# Patient Record
Sex: MALE | Race: WHITE | Employment: OTHER
[De-identification: names, ages, dates, MRNs, and addresses within clinical notes are randomized per-mention and may not be internally consistent; named-entity substitution may affect disease eponyms.]

---

## 2024-11-03 ENCOUNTER — APPOINTMENT (OUTPATIENT)
Facility: HOSPITAL | Age: 79
DRG: 548 | End: 2024-11-03
Payer: MEDICARE

## 2024-11-03 ENCOUNTER — HOSPITAL ENCOUNTER (INPATIENT)
Facility: HOSPITAL | Age: 79
LOS: 3 days | Discharge: HOME | DRG: 548 | End: 2024-11-07
Attending: EMERGENCY MEDICINE | Admitting: HOSPITALIST
Payer: MEDICARE

## 2024-11-03 DIAGNOSIS — R78.81 BACTEREMIA: ICD-10-CM

## 2024-11-03 DIAGNOSIS — D72.829 LEUKOCYTOSIS, UNSPECIFIED TYPE: ICD-10-CM

## 2024-11-03 DIAGNOSIS — M46.58 SEPTIC ARTHRITIS OF SACROILIAC JOINT (HCC): ICD-10-CM

## 2024-11-03 DIAGNOSIS — R10.33 PERIUMBILICAL ABDOMINAL PAIN: Primary | ICD-10-CM

## 2024-11-03 DIAGNOSIS — E80.6 HYPERBILIRUBINEMIA: ICD-10-CM

## 2024-11-03 PROBLEM — R10.9 ABDOMINAL PAIN: Status: ACTIVE | Noted: 2024-11-03

## 2024-11-03 LAB
ALBUMIN SERPL-MCNC: 3 G/DL (ref 3.5–5)
ALBUMIN SERPL-MCNC: 3.1 G/DL (ref 3.5–5)
ALBUMIN SERPL-MCNC: 3.7 G/DL (ref 3.5–5)
ALBUMIN/GLOB SERPL: 0.9 (ref 1.1–2.2)
ALBUMIN/GLOB SERPL: 1 (ref 1.1–2.2)
ALBUMIN/GLOB SERPL: 1 (ref 1.1–2.2)
ALP SERPL-CCNC: 60 U/L (ref 45–117)
ALP SERPL-CCNC: 68 U/L (ref 45–117)
ALP SERPL-CCNC: 74 U/L (ref 45–117)
ALT SERPL-CCNC: 19 U/L (ref 12–78)
ALT SERPL-CCNC: 42 U/L (ref 12–78)
ALT SERPL-CCNC: 49 U/L (ref 12–78)
AMMONIA PLAS-SCNC: 23 UMOL/L
ANION GAP SERPL CALC-SCNC: 4 MMOL/L (ref 2–12)
ANION GAP SERPL CALC-SCNC: 5 MMOL/L (ref 2–12)
ANION GAP SERPL CALC-SCNC: 6 MMOL/L (ref 2–12)
APPEARANCE UR: CLEAR
AST SERPL-CCNC: 16 U/L (ref 15–37)
AST SERPL-CCNC: 33 U/L (ref 15–37)
AST SERPL-CCNC: 39 U/L (ref 15–37)
BACTERIA URNS QL MICRO: NEGATIVE /HPF
BASOPHILS # BLD: 0.1 K/UL (ref 0–0.1)
BASOPHILS # BLD: 0.2 K/UL (ref 0–0.1)
BASOPHILS NFR BLD: 1 % (ref 0–1)
BASOPHILS NFR BLD: 1 % (ref 0–1)
BILIRUB SERPL-MCNC: 2 MG/DL (ref 0.2–1)
BILIRUB SERPL-MCNC: 2.1 MG/DL (ref 0.2–1)
BILIRUB SERPL-MCNC: 2.2 MG/DL (ref 0.2–1)
BILIRUB UR QL: NEGATIVE
BUN SERPL-MCNC: 25 MG/DL (ref 6–20)
BUN SERPL-MCNC: 27 MG/DL (ref 6–20)
BUN SERPL-MCNC: 28 MG/DL (ref 6–20)
BUN/CREAT SERPL: 22 (ref 12–20)
BUN/CREAT SERPL: 26 (ref 12–20)
BUN/CREAT SERPL: 26 (ref 12–20)
CALCIUM SERPL-MCNC: 7.9 MG/DL (ref 8.5–10.1)
CALCIUM SERPL-MCNC: 8.3 MG/DL (ref 8.5–10.1)
CALCIUM SERPL-MCNC: 9.4 MG/DL (ref 8.5–10.1)
CHLORIDE SERPL-SCNC: 106 MMOL/L (ref 97–108)
CHLORIDE SERPL-SCNC: 107 MMOL/L (ref 97–108)
CHLORIDE SERPL-SCNC: 107 MMOL/L (ref 97–108)
CO2 SERPL-SCNC: 25 MMOL/L (ref 21–32)
CO2 SERPL-SCNC: 26 MMOL/L (ref 21–32)
CO2 SERPL-SCNC: 28 MMOL/L (ref 21–32)
COLOR UR: NORMAL
COMMENT:: NORMAL
COMMENT:: NORMAL
CREAT SERPL-MCNC: 0.97 MG/DL (ref 0.7–1.3)
CREAT SERPL-MCNC: 1.05 MG/DL (ref 0.7–1.3)
CREAT SERPL-MCNC: 1.28 MG/DL (ref 0.7–1.3)
DIFFERENTIAL METHOD BLD: ABNORMAL
DIFFERENTIAL METHOD BLD: ABNORMAL
EKG ATRIAL RATE: 77 BPM
EKG DIAGNOSIS: NORMAL
EKG P AXIS: 47 DEGREES
EKG P-R INTERVAL: 232 MS
EKG Q-T INTERVAL: 384 MS
EKG QRS DURATION: 98 MS
EKG QTC CALCULATION (BAZETT): 434 MS
EKG R AXIS: 7 DEGREES
EKG T AXIS: 24 DEGREES
EKG VENTRICULAR RATE: 77 BPM
EOSINOPHIL # BLD: 0 K/UL (ref 0–0.4)
EOSINOPHIL # BLD: 0 K/UL (ref 0–0.4)
EOSINOPHIL NFR BLD: 0 % (ref 0–7)
EOSINOPHIL NFR BLD: 0 % (ref 0–7)
EPITH CASTS URNS QL MICRO: NORMAL /LPF
ERYTHROCYTE [DISTWIDTH] IN BLOOD BY AUTOMATED COUNT: 12.4 % (ref 11.5–14.5)
ERYTHROCYTE [DISTWIDTH] IN BLOOD BY AUTOMATED COUNT: 12.7 % (ref 11.5–14.5)
FLUAV RNA SPEC QL NAA+PROBE: NOT DETECTED
FLUBV RNA SPEC QL NAA+PROBE: NOT DETECTED
GLOBULIN SER CALC-MCNC: 3 G/DL (ref 2–4)
GLOBULIN SER CALC-MCNC: 3.3 G/DL (ref 2–4)
GLOBULIN SER CALC-MCNC: 3.8 G/DL (ref 2–4)
GLUCOSE BLD STRIP.AUTO-MCNC: 103 MG/DL (ref 65–117)
GLUCOSE BLD STRIP.AUTO-MCNC: 140 MG/DL (ref 65–117)
GLUCOSE SERPL-MCNC: 104 MG/DL (ref 65–100)
GLUCOSE SERPL-MCNC: 108 MG/DL (ref 65–100)
GLUCOSE SERPL-MCNC: 154 MG/DL (ref 65–100)
GLUCOSE UR STRIP.AUTO-MCNC: NEGATIVE MG/DL
HCT VFR BLD AUTO: 36 % (ref 36.6–50.3)
HCT VFR BLD AUTO: 40.8 % (ref 36.6–50.3)
HGB BLD-MCNC: 12.3 G/DL (ref 12.1–17)
HGB BLD-MCNC: 13.9 G/DL (ref 12.1–17)
HGB UR QL STRIP: NEGATIVE
HYALINE CASTS URNS QL MICRO: NORMAL /LPF (ref 0–5)
IMM GRANULOCYTES # BLD AUTO: 0 K/UL
IMM GRANULOCYTES # BLD AUTO: 0.1 K/UL (ref 0–0.04)
IMM GRANULOCYTES NFR BLD AUTO: 0 %
IMM GRANULOCYTES NFR BLD AUTO: 1 % (ref 0–0.5)
KETONES UR QL STRIP.AUTO: NEGATIVE MG/DL
LACTATE BLD-SCNC: 0.63 MMOL/L (ref 0.4–2)
LACTATE BLD-SCNC: 0.76 MMOL/L (ref 0.4–2)
LACTATE SERPL-SCNC: 0.8 MMOL/L (ref 0.4–2)
LACTATE SERPL-SCNC: 1.8 MMOL/L (ref 0.4–2)
LEUKOCYTE ESTERASE UR QL STRIP.AUTO: NEGATIVE
LIPASE SERPL-CCNC: 36 U/L (ref 13–75)
LYMPHOCYTES # BLD: 0.3 K/UL (ref 0.8–3.5)
LYMPHOCYTES # BLD: 0.9 K/UL (ref 0.8–3.5)
LYMPHOCYTES NFR BLD: 2 % (ref 12–49)
LYMPHOCYTES NFR BLD: 4 % (ref 12–49)
MCH RBC QN AUTO: 33.4 PG (ref 26–34)
MCH RBC QN AUTO: 33.6 PG (ref 26–34)
MCHC RBC AUTO-ENTMCNC: 34.1 G/DL (ref 30–36.5)
MCHC RBC AUTO-ENTMCNC: 34.2 G/DL (ref 30–36.5)
MCV RBC AUTO: 98.1 FL (ref 80–99)
MCV RBC AUTO: 98.4 FL (ref 80–99)
MONOCYTES # BLD: 0.9 K/UL (ref 0–1)
MONOCYTES # BLD: 1.6 K/UL (ref 0–1)
MONOCYTES NFR BLD: 6 % (ref 5–13)
MONOCYTES NFR BLD: 7 % (ref 5–13)
NEUTS BAND NFR BLD MANUAL: 5 % (ref 0–6)
NEUTS SEG # BLD: 13.2 K/UL (ref 1.8–8)
NEUTS SEG # BLD: 20.6 K/UL (ref 1.8–8)
NEUTS SEG NFR BLD: 83 % (ref 32–75)
NEUTS SEG NFR BLD: 90 % (ref 32–75)
NITRITE UR QL STRIP.AUTO: NEGATIVE
NRBC # BLD: 0 K/UL (ref 0–0.01)
NRBC # BLD: 0 K/UL (ref 0–0.01)
NRBC BLD-RTO: 0 PER 100 WBC
NRBC BLD-RTO: 0 PER 100 WBC
PH UR STRIP: 5.5 (ref 5–8)
PLATELET # BLD AUTO: 128 K/UL (ref 150–400)
PLATELET # BLD AUTO: 179 K/UL (ref 150–400)
PMV BLD AUTO: 10.4 FL (ref 8.9–12.9)
PMV BLD AUTO: 9.9 FL (ref 8.9–12.9)
POTASSIUM SERPL-SCNC: 3.9 MMOL/L (ref 3.5–5.1)
POTASSIUM SERPL-SCNC: 3.9 MMOL/L (ref 3.5–5.1)
POTASSIUM SERPL-SCNC: 4.1 MMOL/L (ref 3.5–5.1)
PROCALCITONIN SERPL-MCNC: 4.24 NG/ML
PROT SERPL-MCNC: 6.1 G/DL (ref 6.4–8.2)
PROT SERPL-MCNC: 6.3 G/DL (ref 6.4–8.2)
PROT SERPL-MCNC: 7.5 G/DL (ref 6.4–8.2)
PROT UR STRIP-MCNC: NEGATIVE MG/DL
RBC # BLD AUTO: 3.66 M/UL (ref 4.1–5.7)
RBC # BLD AUTO: 4.16 M/UL (ref 4.1–5.7)
RBC #/AREA URNS HPF: NORMAL /HPF (ref 0–5)
RBC MORPH BLD: ABNORMAL
RBC MORPH BLD: ABNORMAL
SARS-COV-2 RNA RESP QL NAA+PROBE: NOT DETECTED
SERVICE CMNT-IMP: ABNORMAL
SERVICE CMNT-IMP: NORMAL
SODIUM SERPL-SCNC: 137 MMOL/L (ref 136–145)
SODIUM SERPL-SCNC: 138 MMOL/L (ref 136–145)
SODIUM SERPL-SCNC: 139 MMOL/L (ref 136–145)
SOURCE: NORMAL
SP GR UR REFRACTOMETRY: 1.01
SPECIMEN HOLD: NORMAL
TROPONIN I SERPL HS-MCNC: 19 NG/L (ref 0–76)
UROBILINOGEN UR QL STRIP.AUTO: 1 EU/DL (ref 0.2–1)
WBC # BLD AUTO: 14.6 K/UL (ref 4.1–11.1)
WBC # BLD AUTO: 23.3 K/UL (ref 4.1–11.1)
WBC URNS QL MICRO: NORMAL /HPF (ref 0–4)

## 2024-11-03 PROCEDURE — 87040 BLOOD CULTURE FOR BACTERIA: CPT

## 2024-11-03 PROCEDURE — 73502 X-RAY EXAM HIP UNI 2-3 VIEWS: CPT

## 2024-11-03 PROCEDURE — 96365 THER/PROPH/DIAG IV INF INIT: CPT

## 2024-11-03 PROCEDURE — 82140 ASSAY OF AMMONIA: CPT

## 2024-11-03 PROCEDURE — 74174 CTA ABD&PLVS W/CONTRAST: CPT

## 2024-11-03 PROCEDURE — 85025 COMPLETE CBC W/AUTO DIFF WBC: CPT

## 2024-11-03 PROCEDURE — 84484 ASSAY OF TROPONIN QUANT: CPT

## 2024-11-03 PROCEDURE — 81001 URINALYSIS AUTO W/SCOPE: CPT

## 2024-11-03 PROCEDURE — 6360000004 HC RX CONTRAST MEDICATION: Performed by: RADIOLOGY

## 2024-11-03 PROCEDURE — G0378 HOSPITAL OBSERVATION PER HR: HCPCS

## 2024-11-03 PROCEDURE — 2580000003 HC RX 258: Performed by: NURSE PRACTITIONER

## 2024-11-03 PROCEDURE — 6360000002 HC RX W HCPCS: Performed by: NURSE PRACTITIONER

## 2024-11-03 PROCEDURE — 82962 GLUCOSE BLOOD TEST: CPT

## 2024-11-03 PROCEDURE — 96361 HYDRATE IV INFUSION ADD-ON: CPT

## 2024-11-03 PROCEDURE — 87636 SARSCOV2 & INF A&B AMP PRB: CPT

## 2024-11-03 PROCEDURE — 96372 THER/PROPH/DIAG INJ SC/IM: CPT

## 2024-11-03 PROCEDURE — 87186 SC STD MICRODIL/AGAR DIL: CPT

## 2024-11-03 PROCEDURE — 2580000003 HC RX 258: Performed by: STUDENT IN AN ORGANIZED HEALTH CARE EDUCATION/TRAINING PROGRAM

## 2024-11-03 PROCEDURE — 83690 ASSAY OF LIPASE: CPT

## 2024-11-03 PROCEDURE — 87154 CUL TYP ID BLD PTHGN 6+ TRGT: CPT

## 2024-11-03 PROCEDURE — 6360000002 HC RX W HCPCS: Performed by: EMERGENCY MEDICINE

## 2024-11-03 PROCEDURE — 6360000002 HC RX W HCPCS: Performed by: STUDENT IN AN ORGANIZED HEALTH CARE EDUCATION/TRAINING PROGRAM

## 2024-11-03 PROCEDURE — 80053 COMPREHEN METABOLIC PANEL: CPT

## 2024-11-03 PROCEDURE — 96374 THER/PROPH/DIAG INJ IV PUSH: CPT

## 2024-11-03 PROCEDURE — 93005 ELECTROCARDIOGRAM TRACING: CPT | Performed by: EMERGENCY MEDICINE

## 2024-11-03 PROCEDURE — 87077 CULTURE AEROBIC IDENTIFY: CPT

## 2024-11-03 PROCEDURE — 6370000000 HC RX 637 (ALT 250 FOR IP): Performed by: EMERGENCY MEDICINE

## 2024-11-03 PROCEDURE — 6370000000 HC RX 637 (ALT 250 FOR IP): Performed by: NURSE PRACTITIONER

## 2024-11-03 PROCEDURE — 36415 COLL VENOUS BLD VENIPUNCTURE: CPT

## 2024-11-03 PROCEDURE — 96375 TX/PRO/DX INJ NEW DRUG ADDON: CPT

## 2024-11-03 PROCEDURE — 93010 ELECTROCARDIOGRAM REPORT: CPT | Performed by: INTERNAL MEDICINE

## 2024-11-03 PROCEDURE — 96366 THER/PROPH/DIAG IV INF ADDON: CPT

## 2024-11-03 PROCEDURE — 83605 ASSAY OF LACTIC ACID: CPT

## 2024-11-03 PROCEDURE — 99285 EMERGENCY DEPT VISIT HI MDM: CPT

## 2024-11-03 PROCEDURE — 84145 PROCALCITONIN (PCT): CPT

## 2024-11-03 PROCEDURE — 71045 X-RAY EXAM CHEST 1 VIEW: CPT

## 2024-11-03 RX ORDER — SODIUM CHLORIDE 9 MG/ML
INJECTION, SOLUTION INTRAVENOUS PRN
Status: DISCONTINUED | OUTPATIENT
Start: 2024-11-03 | End: 2024-11-07 | Stop reason: HOSPADM

## 2024-11-03 RX ORDER — FAMOTIDINE 20 MG/1
20 TABLET, FILM COATED ORAL 2 TIMES DAILY
Qty: 30 TABLET | Refills: 0 | Status: SHIPPED | OUTPATIENT
Start: 2024-11-03

## 2024-11-03 RX ORDER — MAGNESIUM SULFATE IN WATER 40 MG/ML
2000 INJECTION, SOLUTION INTRAVENOUS PRN
Status: DISCONTINUED | OUTPATIENT
Start: 2024-11-03 | End: 2024-11-07 | Stop reason: HOSPADM

## 2024-11-03 RX ORDER — LOSARTAN POTASSIUM 50 MG/1
100 TABLET ORAL DAILY
Status: DISCONTINUED | OUTPATIENT
Start: 2024-11-03 | End: 2024-11-07 | Stop reason: HOSPADM

## 2024-11-03 RX ORDER — ACETAMINOPHEN 500 MG
1000 TABLET ORAL
Status: COMPLETED | OUTPATIENT
Start: 2024-11-03 | End: 2024-11-03

## 2024-11-03 RX ORDER — LIDOCAINE 4 G/G
1 PATCH TOPICAL
Status: COMPLETED | OUTPATIENT
Start: 2024-11-03 | End: 2024-11-03

## 2024-11-03 RX ORDER — POLYETHYLENE GLYCOL 3350 17 G/17G
17 POWDER, FOR SOLUTION ORAL DAILY PRN
Status: DISCONTINUED | OUTPATIENT
Start: 2024-11-03 | End: 2024-11-07 | Stop reason: HOSPADM

## 2024-11-03 RX ORDER — 0.9 % SODIUM CHLORIDE 0.9 %
500 INTRAVENOUS SOLUTION INTRAVENOUS ONCE
Status: COMPLETED | OUTPATIENT
Start: 2024-11-03 | End: 2024-11-03

## 2024-11-03 RX ORDER — POTASSIUM CHLORIDE 750 MG/1
40 TABLET, EXTENDED RELEASE ORAL PRN
Status: DISCONTINUED | OUTPATIENT
Start: 2024-11-03 | End: 2024-11-07 | Stop reason: HOSPADM

## 2024-11-03 RX ORDER — SODIUM CHLORIDE 0.9 % (FLUSH) 0.9 %
5-40 SYRINGE (ML) INJECTION PRN
Status: DISCONTINUED | OUTPATIENT
Start: 2024-11-03 | End: 2024-11-07 | Stop reason: HOSPADM

## 2024-11-03 RX ORDER — ONDANSETRON 2 MG/ML
4 INJECTION INTRAMUSCULAR; INTRAVENOUS EVERY 6 HOURS PRN
Status: DISCONTINUED | OUTPATIENT
Start: 2024-11-03 | End: 2024-11-07 | Stop reason: HOSPADM

## 2024-11-03 RX ORDER — ONDANSETRON 4 MG/1
4 TABLET, ORALLY DISINTEGRATING ORAL EVERY 8 HOURS PRN
Status: DISCONTINUED | OUTPATIENT
Start: 2024-11-03 | End: 2024-11-03 | Stop reason: SDUPTHER

## 2024-11-03 RX ORDER — ACETAMINOPHEN 500 MG
1000 TABLET ORAL 3 TIMES DAILY PRN
Qty: 30 TABLET | Refills: 0 | Status: SHIPPED | OUTPATIENT
Start: 2024-11-03

## 2024-11-03 RX ORDER — POTASSIUM CHLORIDE 7.45 MG/ML
10 INJECTION INTRAVENOUS PRN
Status: DISCONTINUED | OUTPATIENT
Start: 2024-11-03 | End: 2024-11-07 | Stop reason: HOSPADM

## 2024-11-03 RX ORDER — VERAPAMIL HYDROCHLORIDE 180 MG/1
180 TABLET, EXTENDED RELEASE ORAL NIGHTLY
Status: DISCONTINUED | OUTPATIENT
Start: 2024-11-03 | End: 2024-11-07 | Stop reason: HOSPADM

## 2024-11-03 RX ORDER — IOPAMIDOL 755 MG/ML
100 INJECTION, SOLUTION INTRAVASCULAR
Status: COMPLETED | OUTPATIENT
Start: 2024-11-03 | End: 2024-11-03

## 2024-11-03 RX ORDER — ONDANSETRON 4 MG/1
4 TABLET, ORALLY DISINTEGRATING ORAL EVERY 8 HOURS PRN
Status: DISCONTINUED | OUTPATIENT
Start: 2024-11-03 | End: 2024-11-07 | Stop reason: HOSPADM

## 2024-11-03 RX ORDER — ACETAMINOPHEN 325 MG/1
650 TABLET ORAL EVERY 6 HOURS PRN
Status: DISCONTINUED | OUTPATIENT
Start: 2024-11-03 | End: 2024-11-07 | Stop reason: HOSPADM

## 2024-11-03 RX ORDER — ENOXAPARIN SODIUM 100 MG/ML
40 INJECTION SUBCUTANEOUS DAILY
Status: DISCONTINUED | OUTPATIENT
Start: 2024-11-03 | End: 2024-11-07 | Stop reason: HOSPADM

## 2024-11-03 RX ORDER — SODIUM CHLORIDE 9 MG/ML
INJECTION, SOLUTION INTRAVENOUS CONTINUOUS
Status: DISPENSED | OUTPATIENT
Start: 2024-11-03 | End: 2024-11-03

## 2024-11-03 RX ORDER — TAMSULOSIN HYDROCHLORIDE 0.4 MG/1
0.4 CAPSULE ORAL DAILY
Status: DISCONTINUED | OUTPATIENT
Start: 2024-11-03 | End: 2024-11-07 | Stop reason: HOSPADM

## 2024-11-03 RX ORDER — SODIUM CHLORIDE 0.9 % (FLUSH) 0.9 %
5-40 SYRINGE (ML) INJECTION EVERY 12 HOURS SCHEDULED
Status: DISCONTINUED | OUTPATIENT
Start: 2024-11-03 | End: 2024-11-07 | Stop reason: HOSPADM

## 2024-11-03 RX ORDER — ACETAMINOPHEN 650 MG/1
650 SUPPOSITORY RECTAL EVERY 6 HOURS PRN
Status: DISCONTINUED | OUTPATIENT
Start: 2024-11-03 | End: 2024-11-07 | Stop reason: HOSPADM

## 2024-11-03 RX ORDER — FAMOTIDINE 20 MG/1
20 TABLET, FILM COATED ORAL 2 TIMES DAILY
Status: DISCONTINUED | OUTPATIENT
Start: 2024-11-03 | End: 2024-11-07 | Stop reason: HOSPADM

## 2024-11-03 RX ORDER — KETOROLAC TROMETHAMINE 30 MG/ML
15 INJECTION, SOLUTION INTRAMUSCULAR; INTRAVENOUS
Status: COMPLETED | OUTPATIENT
Start: 2024-11-03 | End: 2024-11-03

## 2024-11-03 RX ADMIN — KETOROLAC TROMETHAMINE 15 MG: 30 INJECTION, SOLUTION INTRAMUSCULAR at 07:51

## 2024-11-03 RX ADMIN — ENOXAPARIN SODIUM 40 MG: 100 INJECTION SUBCUTANEOUS at 10:17

## 2024-11-03 RX ADMIN — WATER 2000 MG: 1 INJECTION INTRAMUSCULAR; INTRAVENOUS; SUBCUTANEOUS at 18:34

## 2024-11-03 RX ADMIN — SODIUM CHLORIDE, PRESERVATIVE FREE 10 ML: 5 INJECTION INTRAVENOUS at 20:14

## 2024-11-03 RX ADMIN — ACETAMINOPHEN 650 MG: 325 TABLET ORAL at 22:22

## 2024-11-03 RX ADMIN — ACETAMINOPHEN 650 MG: 325 TABLET ORAL at 17:50

## 2024-11-03 RX ADMIN — FAMOTIDINE 20 MG: 20 TABLET, FILM COATED ORAL at 07:54

## 2024-11-03 RX ADMIN — ONDANSETRON 4 MG: 4 TABLET, ORALLY DISINTEGRATING ORAL at 02:46

## 2024-11-03 RX ADMIN — IOPAMIDOL 100 ML: 755 INJECTION, SOLUTION INTRAVENOUS at 04:20

## 2024-11-03 RX ADMIN — VANCOMYCIN HYDROCHLORIDE 2250 MG: 10 INJECTION, POWDER, LYOPHILIZED, FOR SOLUTION INTRAVENOUS at 18:44

## 2024-11-03 RX ADMIN — LIDOCAINE HYDROCHLORIDE 40 ML: 20 SOLUTION ORAL at 02:46

## 2024-11-03 RX ADMIN — ACETAMINOPHEN 1000 MG: 500 TABLET ORAL at 07:53

## 2024-11-03 RX ADMIN — SODIUM CHLORIDE 500 ML: 9 INJECTION, SOLUTION INTRAVENOUS at 10:20

## 2024-11-03 RX ADMIN — FAMOTIDINE 20 MG: 20 TABLET, FILM COATED ORAL at 20:13

## 2024-11-03 RX ADMIN — TAMSULOSIN HYDROCHLORIDE 0.4 MG: 0.4 CAPSULE ORAL at 18:34

## 2024-11-03 RX ADMIN — FAMOTIDINE 20 MG: 20 TABLET, FILM COATED ORAL at 02:46

## 2024-11-03 ASSESSMENT — PAIN SCALES - GENERAL
PAINLEVEL_OUTOF10: 0
PAINLEVEL_OUTOF10: 6
PAINLEVEL_OUTOF10: 8
PAINLEVEL_OUTOF10: 7

## 2024-11-03 ASSESSMENT — PAIN DESCRIPTION - DESCRIPTORS
DESCRIPTORS: ACHING
DESCRIPTORS: DULL

## 2024-11-03 ASSESSMENT — PAIN - FUNCTIONAL ASSESSMENT
PAIN_FUNCTIONAL_ASSESSMENT: ACTIVITIES ARE NOT PREVENTED
PAIN_FUNCTIONAL_ASSESSMENT: 0-10
PAIN_FUNCTIONAL_ASSESSMENT: ACTIVITIES ARE NOT PREVENTED

## 2024-11-03 ASSESSMENT — PAIN DESCRIPTION - LOCATION
LOCATION: ABDOMEN;HIP
LOCATION: GENERALIZED
LOCATION: BUTTOCKS

## 2024-11-03 ASSESSMENT — PAIN DESCRIPTION - ONSET: ONSET: ON-GOING

## 2024-11-03 ASSESSMENT — PAIN DESCRIPTION - PAIN TYPE: TYPE: ACUTE PAIN

## 2024-11-03 ASSESSMENT — PAIN DESCRIPTION - FREQUENCY: FREQUENCY: CONTINUOUS

## 2024-11-03 ASSESSMENT — PAIN DESCRIPTION - ORIENTATION
ORIENTATION: MID;RIGHT;LEFT
ORIENTATION: RIGHT

## 2024-11-03 NOTE — ED TRIAGE NOTES
Patient arrives ambulatory with c/o knee, hip, and abdominal pain for the past few days. Denies N/V/D and urinary symptoms. States the pain is moving all over.

## 2024-11-03 NOTE — ED NOTES
Patient provided warm blanket and pillow.  
Verbal shift change report given to SINCERE Alejandre (oncoming nurse) by Marti SMITH RN (offgoing nurse). Report included the following information Nurse Handoff Report, ED SBAR, Intake/Output, MAR, Recent Results, and Neuro Assessment.    
83 (*)     Lymphocytes % 4 (*)     Neutrophils Absolute 20.6 (*)     Monocytes Absolute 1.6 (*)     Basophils Absolute 0.2 (*)     All other components within normal limits   COMPREHENSIVE METABOLIC PANEL - Abnormal; Notable for the following components:    Glucose 154 (*)     BUN 28 (*)     BUN/Creatinine Ratio 22 (*)     Est, Glom Filt Rate 56 (*)     Total Bilirubin 2.0 (*)     Albumin/Globulin Ratio 1.0 (*)     All other components within normal limits   COMPREHENSIVE METABOLIC PANEL - Abnormal; Notable for the following components:    Glucose 108 (*)     BUN 27 (*)     BUN/Creatinine Ratio 26 (*)     Calcium 8.3 (*)     Total Bilirubin 2.2 (*)     AST 39 (*)     Total Protein 6.3 (*)     Albumin 3.0 (*)     Albumin/Globulin Ratio 0.9 (*)     All other components within normal limits       Vitals/MEWS: MEWS Score: 0  Level of Consciousness: Alert (0)   Vitals:    11/03/24 0304 11/03/24 0311 11/03/24 0400 11/03/24 0800   BP: 111/70  106/69 134/76   Pulse: 79 73 73 89   Resp: 10 14 12 19   Temp:       TempSrc:       SpO2: (!) 89% 94% 95% 93%   Weight:       Height:         DI:   Predictive Model Details          35 (Caution)  Factor Value    Calculated 11/3/2024 12:20 36% Age 79 years old    Deterioration Index Model 31% Supplemental oxygen Nasal cannula     11% Respiratory rate 19     11% WBC count abnormal (23.3 K/uL)     3% Systolic 134     3% Pulse oximetry 93 %     3% BUN abnormal (27 MG/DL)     2% Sodium 138 mmol/L     1% Pulse 89     0% Temperature 97.9 °F (36.6 °C)     0% Potassium 3.9 mmol/L     0% Hematocrit 40.8 %         FiO2 (%): 2L NC  O2 Flow Rate: O2 Device: Nasal cannula O2 Flow Rate (L/min): 2 L/min  Cardiac Rhythm:      Pain Scale: Pain Assessment  Pain Assessment: 0-10  Pain Level: 6  Patient's Stated Pain Goal: 0 - No pain  Pain Location: Buttocks  Pain Orientation: Right  Pain Descriptors: Aching  Functional Pain Assessment: Activities are not prevented  Pain Type: Acute pain  Pain Radiating

## 2024-11-03 NOTE — ACP (ADVANCE CARE PLANNING)
Advance Care Planning     Advance Care Planning (ACP) Physician/NP/PA Conversation    Date of Conversation: 11/3/2024  Conducted with: Patient with Decision Making Capacity  Other persons present: None    Healthcare Decision Maker: Wife, Alexandra You, [phone]      Care Preferences:    Hospitalization:  \"If your health worsens and it becomes clear that your chance of recovery is unlikely, what would be your preference regarding hospitalization?\"  The patient would prefer hospitalization.    Ventilation:  \"If you were unable to breath on your own and your chance of recovery was unlikely, what would be your preference about the use of a ventilator (breathing machine) if it was available to you?\"  The patient would desire the use of a ventilator.    Resuscitation:  \"In the event your heart stopped as a result of an underlying serious health condition, would you want attempts made to restart your heart, or would you prefer a natural death?\"  Yes, attempt to resuscitate.    ventilation preferences, hospitalization preferences, and resuscitation preferences    Conversation Outcomes / Follow-Up Plan:  ACP complete - no further action today  Reviewed DNR/DNI and patient elects Full Code (Attempt Resuscitation)    Length of Voluntary ACP Conversation in minutes:  16 minutes    SHAY Diaz - KINGS

## 2024-11-03 NOTE — H&P
History and Physical    Date of Service:  11/3/2024  Primary Care Provider: Oscar Toledo MD  Source of information: patient, electronic medical record    Chief Complaint: Abdominal Pain and Hip Pain      History of Presenting Illness:   Perfecto You is a 79 y.o. male with a past medical history significant for hypertension reports to the emergency department at our facility with complaints of severe abdominal pain.  His trouble started about 2 weeks ago after walking [place] the golf [place] developed right knee pain which 2 days prior to presentation developed into right hip pain.  Overnight he developed severe abdominal pain and presented to the emergency department at our facility.  Further workup in the emergency department revealed leukocytosis, 23, BUN was mildly elevated at 28 however creatinine normal at 1.28.  Urinalysis was unremarkable,Chest radiograph was also unremarkable as was his AP CT.  Patient was then referred to the hospitalist service for further management.  At the moment of the initial interview he was cooperative with the exam.  Abdominal pain had greatly improved, does report he is unable to walk however laying in bed he is unable to reproduce his hip pain.  He denies fever, chills, urinary symptoms           REVIEW OF SYSTEMS:  A comprehensive review of systems was negative except for that written in the History of Present Illness.     No past medical history on file.   No past surgical history on file.  Prior to Admission medications    Medication Sig Start Date End Date Taking? Authorizing Provider   famotidine (PEPCID) 20 MG tablet Take 1 tablet by mouth 2 times daily 11/3/24  Yes Prince Mueller MD   acetaminophen (TYLENOL) 500 MG tablet Take 2 tablets by mouth 3 times daily as needed for Pain 11/3/24  Yes Prince Mueller MD     No Known Allergies   No family history on file.   Social History:     Social Determinants of Health     Tobacco Use: Medium Risk (10/23/2024)    Received

## 2024-11-03 NOTE — SIGNIFICANT EVENT
Rapid Response Team Sepsis Screening  Is the patient's history suggestive of a new infection? Yes Suspect infection: Source unknown    Are two or more SIRS criteria present? Yes SIRS Criteria: Temperature > 38.3 C/100.9 F, Acutely altered mental status, Heart rate (pulse) > 90 bpm, and WBC > 12 k/mcL    Is there evidence of Organ Dysfunction? [facility] Sepsis OD: Bilirubin > 2    Communication with provider: Yes, Johnie(name)    Was a Code Sepsis called at this encounter? Yes. Code Sepsis called at 1802. Sepsis score at the time of the call 28. Actions taken Blood Cultures drawn, Initial Lactic Acid drawn, and IV/IM Antibiotic ordered & given

## 2024-11-03 NOTE — DISCHARGE INSTRUCTIONS
Discharge Instructions       PATIENT ID: Perfecto You  MRN: [id number]   YOB: 1945    DATE OF ADMISSION: 11/3/2024   DATE OF DISCHARGE: 11/7/2024    PRIMARY CARE PROVIDER: Oscar Toledo     ATTENDING PHYSICIAN: Darius Prado MD   DISCHARGING PROVIDER: SHAY Mendez NP    To contact this individual call [phone] and ask the  to page.   If unavailable ask to be transferred the Adult Hospitalist Department.    DISCHARGE DIAGNOSES     MSSA Bacteremia   Right sacroiliac joint septic arthritis    CONSULTATIONS:     Orthopedic Surgery   Infectious Disease     PROCEDURES/SURGERIES: * No surgery found *    PENDING TEST RESULTS:   At the time of discharge the following test results are still pending: none    FOLLOW UP APPOINTMENTS:     As below     ADDITIONAL CARE RECOMMENDATIONS:     Please continue IV antibiotics as prescribed   Please follow up with orthopedic surgery AND infectious disease in 3-4 weeks time. You will need to call and schedule these appointments.   We have arranged home health for physical therapy. They will contact you likely tomorrow to arrange your first visit.     Orthopedics:     Plan:     Suspected right sided sacroiliac joint septic arthritis with associated sciatic nerve neuritis     Discussed imaging findings in conjunction with patient's present symptoms.  SI joint inflammation and septic arthritis matches with his current symptomatology.  Location of collection and size of collection make invasive procedures to drain or washout unfeasible.  Discussed with infectious disease service.  Recommend continuing with IV antibiotics for extended period.  PT/OT for mobilization; WBAT through bilateral lower extremities  No plan for surgical intervention at this time  Medical management per primary team  Case management for disposition planning    Infectious Disease:     Discharge IV Antibiotic Order  [facility]  [id number]

## 2024-11-03 NOTE — ED PROVIDER NOTES
https://www.kidney.org/professionals/kdoqi/gfr_calculatorped     These results are not intended for use in patients <18 years of age.     eGFR results are calculated without a race factor using  the 2021 CKD-EPI equation. Careful clinical correlation is recommended, particularly when comparing to results calculated using previous equations.  The CKD-EPI equation is less accurate in patients with extremes of muscle mass, extra-renal metabolism of creatinine, excessive creatine ingestion, or following therapy that affects renal tubular secretion.      Calcium 11/03/2024 7.9 (L)  8.5 - 10.1 MG/DL Final    Total Bilirubin 11/03/2024 2.1 (H)  0.2 - 1.0 MG/DL Final    ALT 11/03/2024 42  12 - 78 U/L Final    AST 11/03/2024 33  15 - 37 U/L Final    Alk Phosphatase 11/03/2024 68  45 - 117 U/L Final    Total Protein 11/03/2024 6.1 (L)  6.4 - 8.2 g/dL Final    Albumin 11/03/2024 3.1 (L)  3.5 - 5.0 g/dL Final    Globulin 11/03/2024 3.0  2.0 - 4.0 g/dL Final    Albumin/Globulin Ratio 11/03/2024 1.0 (L)  1.1 - 2.2   Final    Special Requests 11/03/2024     Preliminary                    Value:RIGHT  FOREARM      Culture 11/03/2024 NO GROWTH <24 HRS    Preliminary    Special Requests 11/03/2024     Preliminary                    Value:LEFT  HAND      Culture 11/03/2024 NO GROWTH <24 HRS    Preliminary    Specimen HOld 11/03/2024 1blue,1pst,1red   Final    Comment: 11/03/2024 Add-on orders for these samples will be processed based on acceptable specimen integrity and analyte stability, which may vary by analyte.    Final    Lactic Acid, Sepsis 11/03/2024 0.8  0.4 - 2.0 MMOL/L Final    POC Lactic Acid 11/03/2024 0.76  0.40 - 2.00 mmol/L Final         EMERGENCY DEPARTMENT COURSE and DIFFERENTIAL DIAGNOSIS/MDM:   Vitals:    Vitals:    11/03/24 1745 11/03/24 1845 11/03/24 1900 11/03/24 2000   BP: (!) 147/67 130/60  (!) 140/73   Pulse: (!) 104 (!) 102 98 95   Resp: 20 20  18   Temp: (!) 102.4 °F (39.1 °C) 99.5 °F (37.5 °C)  98.8 °F

## 2024-11-04 ENCOUNTER — APPOINTMENT (OUTPATIENT)
Facility: HOSPITAL | Age: 79
DRG: 548 | End: 2024-11-04
Payer: MEDICARE

## 2024-11-04 ENCOUNTER — APPOINTMENT (OUTPATIENT)
Facility: HOSPITAL | Age: 79
DRG: 548 | End: 2024-11-04
Attending: STUDENT IN AN ORGANIZED HEALTH CARE EDUCATION/TRAINING PROGRAM
Payer: MEDICARE

## 2024-11-04 PROBLEM — D72.829 LEUKOCYTOSIS: Status: ACTIVE | Noted: 2024-11-04

## 2024-11-04 PROBLEM — A41.01 SEPSIS DUE TO METHICILLIN SUSCEPTIBLE STAPHYLOCOCCUS AUREUS (MSSA) WITH ENCEPHALOPATHY WITHOUT SEPTIC SHOCK (HCC): Status: ACTIVE | Noted: 2024-11-04

## 2024-11-04 PROBLEM — R78.81 MSSA BACTEREMIA: Status: ACTIVE | Noted: 2024-11-04

## 2024-11-04 PROBLEM — E80.6 HYPERBILIRUBINEMIA: Status: ACTIVE | Noted: 2024-11-04

## 2024-11-04 PROBLEM — R65.20 SEPSIS DUE TO METHICILLIN SUSCEPTIBLE STAPHYLOCOCCUS AUREUS (MSSA) WITH ENCEPHALOPATHY WITHOUT SEPTIC SHOCK (HCC): Status: ACTIVE | Noted: 2024-11-04

## 2024-11-04 PROBLEM — B95.61 MSSA BACTEREMIA: Status: ACTIVE | Noted: 2024-11-04

## 2024-11-04 PROBLEM — G93.41 SEPSIS DUE TO METHICILLIN SUSCEPTIBLE STAPHYLOCOCCUS AUREUS (MSSA) WITH ENCEPHALOPATHY WITHOUT SEPTIC SHOCK (HCC): Status: ACTIVE | Noted: 2024-11-04

## 2024-11-04 LAB
ACB COMPLEX DNA BLD POS QL NAA+NON-PROBE: NOT DETECTED
ACCESSION NUMBER, LLC1M: ABNORMAL
ALBUMIN SERPL-MCNC: 2.6 G/DL (ref 3.5–5)
ALBUMIN/GLOB SERPL: 0.7 (ref 1.1–2.2)
ALP SERPL-CCNC: 62 U/L (ref 45–117)
ALT SERPL-CCNC: 32 U/L (ref 12–78)
ANION GAP SERPL CALC-SCNC: 5 MMOL/L (ref 2–12)
AST SERPL-CCNC: 26 U/L (ref 15–37)
B FRAGILIS DNA BLD POS QL NAA+NON-PROBE: NOT DETECTED
BACTERIA SPEC CULT: NORMAL
BACTERIA SPEC CULT: NORMAL
BASOPHILS # BLD: 0.1 K/UL (ref 0–0.1)
BASOPHILS NFR BLD: 1 % (ref 0–1)
BILIRUB SERPL-MCNC: 1.9 MG/DL (ref 0.2–1)
BIOFIRE TEST COMMENT: ABNORMAL
BUN SERPL-MCNC: 20 MG/DL (ref 6–20)
BUN/CREAT SERPL: 20 (ref 12–20)
C ALBICANS DNA BLD POS QL NAA+NON-PROBE: NOT DETECTED
C AURIS DNA BLD POS QL NAA+NON-PROBE: NOT DETECTED
C GATTII+NEOFOR DNA BLD POS QL NAA+N-PRB: NOT DETECTED
C GLABRATA DNA BLD POS QL NAA+NON-PROBE: NOT DETECTED
C KRUSEI DNA BLD POS QL NAA+NON-PROBE: NOT DETECTED
C PARAP DNA BLD POS QL NAA+NON-PROBE: NOT DETECTED
C TROPICLS DNA BLD POS QL NAA+NON-PROBE: NOT DETECTED
CALCIUM SERPL-MCNC: 8.4 MG/DL (ref 8.5–10.1)
CHLORIDE SERPL-SCNC: 107 MMOL/L (ref 97–108)
CO2 SERPL-SCNC: 24 MMOL/L (ref 21–32)
CREAT SERPL-MCNC: 0.98 MG/DL (ref 0.7–1.3)
DIFFERENTIAL METHOD BLD: ABNORMAL
E CLOAC COMP DNA BLD POS NAA+NON-PROBE: NOT DETECTED
E COLI DNA BLD POS QL NAA+NON-PROBE: NOT DETECTED
E FAECALIS DNA BLD POS QL NAA+NON-PROBE: NOT DETECTED
E FAECIUM DNA BLD POS QL NAA+NON-PROBE: NOT DETECTED
ECHO AO ROOT DIAM: 4.1 CM
ECHO AO ROOT INDEX: 1.92 CM/M2
ECHO AV AREA PEAK VELOCITY: 2.7 CM2
ECHO AV AREA/BSA PEAK VELOCITY: 1.3 CM2/M2
ECHO AV PEAK GRADIENT: 7 MMHG
ECHO AV PEAK VELOCITY: 1.3 M/S
ECHO AV VELOCITY RATIO: 0.77
ECHO BSA: 2.17 M2
ECHO EST RA PRESSURE: 3 MMHG
ECHO LA DIAMETER INDEX: 2.34 CM/M2
ECHO LA DIAMETER: 5 CM
ECHO LA TO AORTIC ROOT RATIO: 1.22
ECHO LV E' LATERAL VELOCITY: 9 CM/S
ECHO LV E' SEPTAL VELOCITY: 9.6 CM/S
ECHO LV EF PHYSICIAN: 55 %
ECHO LV FRACTIONAL SHORTENING: 35 % (ref 28–44)
ECHO LV INTERNAL DIMENSION DIASTOLE INDEX: 2.52 CM/M2
ECHO LV INTERNAL DIMENSION DIASTOLIC: 5.4 CM (ref 4.2–5.9)
ECHO LV INTERNAL DIMENSION SYSTOLIC INDEX: 1.64 CM/M2
ECHO LV INTERNAL DIMENSION SYSTOLIC: 3.5 CM
ECHO LV IVSD: 1 CM (ref 0.6–1)
ECHO LV MASS 2D: 220.6 G (ref 88–224)
ECHO LV MASS INDEX 2D: 103.1 G/M2 (ref 49–115)
ECHO LV POSTERIOR WALL DIASTOLIC: 1.1 CM (ref 0.6–1)
ECHO LV RELATIVE WALL THICKNESS RATIO: 0.41
ECHO LVOT AREA: 3.5 CM2
ECHO LVOT DIAM: 2.1 CM
ECHO LVOT PEAK GRADIENT: 4 MMHG
ECHO LVOT PEAK VELOCITY: 1 M/S
ECHO MV A VELOCITY: 1.09 M/S
ECHO MV AREA PHT: 3.2 CM2
ECHO MV E DECELERATION TIME (DT): 235.5 MS
ECHO MV E VELOCITY: 1 M/S
ECHO MV E/A RATIO: 0.92
ECHO MV E/E' LATERAL: 11.11
ECHO MV E/E' RATIO (AVERAGED): 10.76
ECHO MV E/E' SEPTAL: 10.42
ECHO MV PRESSURE HALF TIME (PHT): 68.3 MS
ECHO PV MAX VELOCITY: 0.6 M/S
ECHO PV PEAK GRADIENT: 2 MMHG
ECHO RIGHT VENTRICULAR SYSTOLIC PRESSURE (RVSP): 39 MMHG
ECHO RV FREE WALL PEAK S': 10.5 CM/S
ECHO RV INTERNAL DIMENSION: 3.4 CM
ECHO RV TAPSE: 2.6 CM (ref 1.7–?)
ECHO TV REGURGITANT MAX VELOCITY: 2.98 M/S
ECHO TV REGURGITANT PEAK GRADIENT: 36 MMHG
ENTEROBACTERALES DNA BLD POS NAA+N-PRB: NOT DETECTED
EOSINOPHIL # BLD: 0 K/UL (ref 0–0.4)
EOSINOPHIL NFR BLD: 0 % (ref 0–7)
ERYTHROCYTE [DISTWIDTH] IN BLOOD BY AUTOMATED COUNT: 12.6 % (ref 11.5–14.5)
GLOBULIN SER CALC-MCNC: 3.7 G/DL (ref 2–4)
GLUCOSE SERPL-MCNC: 113 MG/DL (ref 65–100)
GP B STREP DNA BLD POS QL NAA+NON-PROBE: NOT DETECTED
HAEM INFLU DNA BLD POS QL NAA+NON-PROBE: NOT DETECTED
HCT VFR BLD AUTO: 34.4 % (ref 36.6–50.3)
HGB BLD-MCNC: 11.9 G/DL (ref 12.1–17)
IMM GRANULOCYTES # BLD AUTO: 0 K/UL
IMM GRANULOCYTES NFR BLD AUTO: 0 %
K OXYTOCA DNA BLD POS QL NAA+NON-PROBE: NOT DETECTED
KLEBSIELLA SP DNA BLD POS QL NAA+NON-PRB: NOT DETECTED
KLEBSIELLA SP DNA BLD POS QL NAA+NON-PRB: NOT DETECTED
L MONOCYTOG DNA BLD POS QL NAA+NON-PROBE: NOT DETECTED
LYMPHOCYTES # BLD: 0.1 K/UL (ref 0.8–3.5)
LYMPHOCYTES NFR BLD: 1 % (ref 12–49)
MCH RBC QN AUTO: 33.5 PG (ref 26–34)
MCHC RBC AUTO-ENTMCNC: 34.6 G/DL (ref 30–36.5)
MCV RBC AUTO: 96.9 FL (ref 80–99)
MECA+MECC+MREJ ISLT/SPM QL: NOT DETECTED
MONOCYTES # BLD: 0.4 K/UL (ref 0–1)
MONOCYTES NFR BLD: 4 % (ref 5–13)
N MEN DNA BLD POS QL NAA+NON-PROBE: NOT DETECTED
NEUTS BAND NFR BLD MANUAL: 10 % (ref 0–6)
NEUTS SEG # BLD: 9.8 K/UL (ref 1.8–8)
NEUTS SEG NFR BLD: 84 % (ref 32–75)
NRBC # BLD: 0 K/UL (ref 0–0.01)
NRBC BLD-RTO: 0 PER 100 WBC
P AERUGINOSA DNA BLD POS NAA+NON-PROBE: NOT DETECTED
PLATELET # BLD AUTO: 109 K/UL (ref 150–400)
PMV BLD AUTO: 9.9 FL (ref 8.9–12.9)
POTASSIUM SERPL-SCNC: 3.6 MMOL/L (ref 3.5–5.1)
PROT SERPL-MCNC: 6.3 G/DL (ref 6.4–8.2)
PROTEUS SP DNA BLD POS QL NAA+NON-PROBE: NOT DETECTED
RBC # BLD AUTO: 3.55 M/UL (ref 4.1–5.7)
RBC MORPH BLD: ABNORMAL
RESISTANT GENE TARGETS: ABNORMAL
S AUREUS DNA BLD POS QL NAA+NON-PROBE: DETECTED
S AUREUS+CONS DNA BLD POS NAA+NON-PROBE: DETECTED
S EPIDERMIDIS DNA BLD POS QL NAA+NON-PRB: NOT DETECTED
S LUGDUNENSIS DNA BLD POS QL NAA+NON-PRB: NOT DETECTED
S MALTOPHILIA DNA BLD POS QL NAA+NON-PRB: NOT DETECTED
S MARCESCENS DNA BLD POS NAA+NON-PROBE: NOT DETECTED
S PNEUM DNA BLD POS QL NAA+NON-PROBE: NOT DETECTED
S PYO DNA BLD POS QL NAA+NON-PROBE: NOT DETECTED
SALMONELLA DNA BLD POS QL NAA+NON-PROBE: NOT DETECTED
SERVICE CMNT-IMP: NORMAL
SODIUM SERPL-SCNC: 136 MMOL/L (ref 136–145)
STREPTOCOCCUS DNA BLD POS NAA+NON-PROBE: NOT DETECTED
WBC # BLD AUTO: 10.4 K/UL (ref 4.1–11.1)

## 2024-11-04 PROCEDURE — 97530 THERAPEUTIC ACTIVITIES: CPT

## 2024-11-04 PROCEDURE — 6370000000 HC RX 637 (ALT 250 FOR IP): Performed by: NURSE PRACTITIONER

## 2024-11-04 PROCEDURE — 71275 CT ANGIOGRAPHY CHEST: CPT

## 2024-11-04 PROCEDURE — 93306 TTE W/DOPPLER COMPLETE: CPT

## 2024-11-04 PROCEDURE — 93970 EXTREMITY STUDY: CPT

## 2024-11-04 PROCEDURE — 1100000000 HC RM PRIVATE

## 2024-11-04 PROCEDURE — 97116 GAIT TRAINING THERAPY: CPT

## 2024-11-04 PROCEDURE — 6360000002 HC RX W HCPCS: Performed by: NURSE PRACTITIONER

## 2024-11-04 PROCEDURE — 6360000002 HC RX W HCPCS: Performed by: STUDENT IN AN ORGANIZED HEALTH CARE EDUCATION/TRAINING PROGRAM

## 2024-11-04 PROCEDURE — 36415 COLL VENOUS BLD VENIPUNCTURE: CPT

## 2024-11-04 PROCEDURE — 2580000003 HC RX 258: Performed by: STUDENT IN AN ORGANIZED HEALTH CARE EDUCATION/TRAINING PROGRAM

## 2024-11-04 PROCEDURE — 2580000003 HC RX 258: Performed by: NURSE PRACTITIONER

## 2024-11-04 PROCEDURE — 97165 OT EVAL LOW COMPLEX 30 MIN: CPT

## 2024-11-04 PROCEDURE — 99222 1ST HOSP IP/OBS MODERATE 55: CPT | Performed by: NURSE PRACTITIONER

## 2024-11-04 PROCEDURE — 97535 SELF CARE MNGMENT TRAINING: CPT

## 2024-11-04 PROCEDURE — APPNB60 APP NON BILLABLE TIME 46-60 MINS: Performed by: NURSE PRACTITIONER

## 2024-11-04 PROCEDURE — 80053 COMPREHEN METABOLIC PANEL: CPT

## 2024-11-04 PROCEDURE — 97161 PT EVAL LOW COMPLEX 20 MIN: CPT

## 2024-11-04 PROCEDURE — 6370000000 HC RX 637 (ALT 250 FOR IP): Performed by: EMERGENCY MEDICINE

## 2024-11-04 PROCEDURE — 85025 COMPLETE CBC W/AUTO DIFF WBC: CPT

## 2024-11-04 PROCEDURE — 96376 TX/PRO/DX INJ SAME DRUG ADON: CPT

## 2024-11-04 PROCEDURE — 6360000004 HC RX CONTRAST MEDICATION: Performed by: RADIOLOGY

## 2024-11-04 PROCEDURE — G0378 HOSPITAL OBSERVATION PER HR: HCPCS

## 2024-11-04 PROCEDURE — 1100000003 HC PRIVATE W/ TELEMETRY

## 2024-11-04 RX ORDER — IOPAMIDOL 755 MG/ML
100 INJECTION, SOLUTION INTRAVASCULAR
Status: COMPLETED | OUTPATIENT
Start: 2024-11-04 | End: 2024-11-04

## 2024-11-04 RX ORDER — QUETIAPINE FUMARATE 25 MG/1
25 TABLET, FILM COATED ORAL NIGHTLY
Status: DISCONTINUED | OUTPATIENT
Start: 2024-11-04 | End: 2024-11-07 | Stop reason: HOSPADM

## 2024-11-04 RX ADMIN — VANCOMYCIN HYDROCHLORIDE 750 MG: 750 INJECTION, POWDER, LYOPHILIZED, FOR SOLUTION INTRAVENOUS at 06:43

## 2024-11-04 RX ADMIN — SODIUM CHLORIDE, PRESERVATIVE FREE 10 ML: 5 INJECTION INTRAVENOUS at 20:23

## 2024-11-04 RX ADMIN — WATER 2000 MG: 1 INJECTION INTRAMUSCULAR; INTRAVENOUS; SUBCUTANEOUS at 18:31

## 2024-11-04 RX ADMIN — ACETAMINOPHEN 650 MG: 325 TABLET ORAL at 05:10

## 2024-11-04 RX ADMIN — FAMOTIDINE 20 MG: 20 TABLET, FILM COATED ORAL at 10:01

## 2024-11-04 RX ADMIN — CEFEPIME 2000 MG: 2 INJECTION, POWDER, FOR SOLUTION INTRAVENOUS at 10:09

## 2024-11-04 RX ADMIN — ACETAMINOPHEN 650 MG: 325 TABLET ORAL at 20:19

## 2024-11-04 RX ADMIN — FAMOTIDINE 20 MG: 20 TABLET, FILM COATED ORAL at 20:19

## 2024-11-04 RX ADMIN — SODIUM CHLORIDE, PRESERVATIVE FREE 10 ML: 5 INJECTION INTRAVENOUS at 10:02

## 2024-11-04 RX ADMIN — TAMSULOSIN HYDROCHLORIDE 0.4 MG: 0.4 CAPSULE ORAL at 20:19

## 2024-11-04 RX ADMIN — ENOXAPARIN SODIUM 40 MG: 100 INJECTION SUBCUTANEOUS at 10:01

## 2024-11-04 RX ADMIN — QUETIAPINE FUMARATE 25 MG: 25 TABLET ORAL at 20:19

## 2024-11-04 RX ADMIN — IOPAMIDOL 100 ML: 755 INJECTION, SOLUTION INTRAVENOUS at 04:33

## 2024-11-04 RX ADMIN — CEFEPIME 2000 MG: 2 INJECTION, POWDER, FOR SOLUTION INTRAVENOUS at 02:32

## 2024-11-04 ASSESSMENT — PAIN DESCRIPTION - DESCRIPTORS: DESCRIPTORS: ACHING

## 2024-11-04 ASSESSMENT — PAIN DESCRIPTION - FREQUENCY: FREQUENCY: INTERMITTENT

## 2024-11-04 ASSESSMENT — PAIN SCALES - GENERAL: PAINLEVEL_OUTOF10: 5

## 2024-11-04 ASSESSMENT — PAIN DESCRIPTION - LOCATION: LOCATION: KNEE

## 2024-11-04 ASSESSMENT — PAIN DESCRIPTION - PAIN TYPE: TYPE: ACUTE PAIN

## 2024-11-04 ASSESSMENT — PAIN DESCRIPTION - ONSET: ONSET: GRADUAL

## 2024-11-04 ASSESSMENT — PAIN DESCRIPTION - ORIENTATION: ORIENTATION: RIGHT;LEFT;INNER

## 2024-11-04 ASSESSMENT — PAIN - FUNCTIONAL ASSESSMENT: PAIN_FUNCTIONAL_ASSESSMENT: PREVENTS OR INTERFERES SOME ACTIVE ACTIVITIES AND ADLS

## 2024-11-04 NOTE — PRE-PROCEDURE INSTRUCTIONS
At 1030, pt was so confused, aggressive, and agitated. Pt got out of bed without calling assistance. PCT helped pt back to bed. Pt tried to get out of bed and wanted to get out of hospital. Code inge was called. Nurse  asked KINGS rene to assess pt. At 1040pm Kings rene and nursing supervisor Stalin were in pt's room. KINGS rene ordered restraint non violent. At 2300, pt's wife was notified all above and answered her question. Pt's wife understood and was  ok pt was on restraint.

## 2024-11-05 ENCOUNTER — APPOINTMENT (OUTPATIENT)
Facility: HOSPITAL | Age: 79
DRG: 548 | End: 2024-11-05
Payer: MEDICARE

## 2024-11-05 LAB
ALBUMIN SERPL-MCNC: 2.6 G/DL (ref 3.5–5)
ALBUMIN/GLOB SERPL: 0.7 (ref 1.1–2.2)
ALP SERPL-CCNC: 70 U/L (ref 45–117)
ALT SERPL-CCNC: 33 U/L (ref 12–78)
ANION GAP SERPL CALC-SCNC: 4 MMOL/L (ref 2–12)
AST SERPL-CCNC: 32 U/L (ref 15–37)
BASOPHILS # BLD: 0 K/UL (ref 0–0.1)
BASOPHILS NFR BLD: 0 % (ref 0–1)
BILIRUB SERPL-MCNC: 1.2 MG/DL (ref 0.2–1)
BUN SERPL-MCNC: 20 MG/DL (ref 6–20)
BUN/CREAT SERPL: 19 (ref 12–20)
CALCIUM SERPL-MCNC: 8.9 MG/DL (ref 8.5–10.1)
CHLORIDE SERPL-SCNC: 109 MMOL/L (ref 97–108)
CO2 SERPL-SCNC: 27 MMOL/L (ref 21–32)
CREAT SERPL-MCNC: 1.07 MG/DL (ref 0.7–1.3)
CRP SERPL-MCNC: 21.9 MG/DL (ref 0–0.3)
DIFFERENTIAL METHOD BLD: ABNORMAL
ECHO BSA: 2.17 M2
EOSINOPHIL # BLD: 0.1 K/UL (ref 0–0.4)
EOSINOPHIL NFR BLD: 1 % (ref 0–7)
ERYTHROCYTE [DISTWIDTH] IN BLOOD BY AUTOMATED COUNT: 12.7 % (ref 11.5–14.5)
GLOBULIN SER CALC-MCNC: 3.5 G/DL (ref 2–4)
GLUCOSE SERPL-MCNC: 106 MG/DL (ref 65–100)
HCT VFR BLD AUTO: 37.2 % (ref 36.6–50.3)
HGB BLD-MCNC: 12.7 G/DL (ref 12.1–17)
IMM GRANULOCYTES # BLD AUTO: 0 K/UL
IMM GRANULOCYTES NFR BLD AUTO: 0 %
LYMPHOCYTES # BLD: 0.3 K/UL (ref 0.8–3.5)
LYMPHOCYTES NFR BLD: 4 % (ref 12–49)
MCH RBC QN AUTO: 33 PG (ref 26–34)
MCHC RBC AUTO-ENTMCNC: 34.1 G/DL (ref 30–36.5)
MCV RBC AUTO: 96.6 FL (ref 80–99)
MONOCYTES # BLD: 0.6 K/UL (ref 0–1)
MONOCYTES NFR BLD: 7 % (ref 5–13)
NEUTS BAND NFR BLD MANUAL: 7 % (ref 0–6)
NEUTS SEG # BLD: 7 K/UL (ref 1.8–8)
NEUTS SEG NFR BLD: 81 % (ref 32–75)
NRBC # BLD: 0 K/UL (ref 0–0.01)
NRBC BLD-RTO: 0 PER 100 WBC
PLATELET # BLD AUTO: 126 K/UL (ref 150–400)
PMV BLD AUTO: 10.5 FL (ref 8.9–12.9)
POTASSIUM SERPL-SCNC: 4 MMOL/L (ref 3.5–5.1)
PROT SERPL-MCNC: 6.1 G/DL (ref 6.4–8.2)
RBC # BLD AUTO: 3.85 M/UL (ref 4.1–5.7)
RBC MORPH BLD: ABNORMAL
SODIUM SERPL-SCNC: 140 MMOL/L (ref 136–145)
WBC # BLD AUTO: 8 K/UL (ref 4.1–11.1)

## 2024-11-05 PROCEDURE — 2580000003 HC RX 258: Performed by: NURSE PRACTITIONER

## 2024-11-05 PROCEDURE — 6370000000 HC RX 637 (ALT 250 FOR IP): Performed by: EMERGENCY MEDICINE

## 2024-11-05 PROCEDURE — 85025 COMPLETE CBC W/AUTO DIFF WBC: CPT

## 2024-11-05 PROCEDURE — 6370000000 HC RX 637 (ALT 250 FOR IP): Performed by: NURSE PRACTITIONER

## 2024-11-05 PROCEDURE — 87040 BLOOD CULTURE FOR BACTERIA: CPT

## 2024-11-05 PROCEDURE — 76705 ECHO EXAM OF ABDOMEN: CPT

## 2024-11-05 PROCEDURE — 1100000000 HC RM PRIVATE

## 2024-11-05 PROCEDURE — 97116 GAIT TRAINING THERAPY: CPT

## 2024-11-05 PROCEDURE — 80053 COMPREHEN METABOLIC PANEL: CPT

## 2024-11-05 PROCEDURE — 6360000002 HC RX W HCPCS: Performed by: NURSE PRACTITIONER

## 2024-11-05 PROCEDURE — 97530 THERAPEUTIC ACTIVITIES: CPT

## 2024-11-05 PROCEDURE — 86140 C-REACTIVE PROTEIN: CPT

## 2024-11-05 PROCEDURE — 99232 SBSQ HOSP IP/OBS MODERATE 35: CPT | Performed by: INTERNAL MEDICINE

## 2024-11-05 RX ORDER — LIDOCAINE 4 G/G
1 PATCH TOPICAL EVERY 24 HOURS
Status: DISCONTINUED | OUTPATIENT
Start: 2024-11-05 | End: 2024-11-07 | Stop reason: HOSPADM

## 2024-11-05 RX ADMIN — SODIUM CHLORIDE, PRESERVATIVE FREE 10 ML: 5 INJECTION INTRAVENOUS at 08:43

## 2024-11-05 RX ADMIN — TAMSULOSIN HYDROCHLORIDE 0.4 MG: 0.4 CAPSULE ORAL at 19:14

## 2024-11-05 RX ADMIN — ACETAMINOPHEN 650 MG: 325 TABLET ORAL at 21:26

## 2024-11-05 RX ADMIN — WATER 2000 MG: 1 INJECTION INTRAMUSCULAR; INTRAVENOUS; SUBCUTANEOUS at 17:47

## 2024-11-05 RX ADMIN — FAMOTIDINE 20 MG: 20 TABLET, FILM COATED ORAL at 20:56

## 2024-11-05 RX ADMIN — QUETIAPINE FUMARATE 25 MG: 25 TABLET ORAL at 20:56

## 2024-11-05 RX ADMIN — FAMOTIDINE 20 MG: 20 TABLET, FILM COATED ORAL at 08:32

## 2024-11-05 RX ADMIN — ENOXAPARIN SODIUM 40 MG: 100 INJECTION SUBCUTANEOUS at 08:32

## 2024-11-05 RX ADMIN — WATER 2000 MG: 1 INJECTION INTRAMUSCULAR; INTRAVENOUS; SUBCUTANEOUS at 09:33

## 2024-11-05 RX ADMIN — WATER 2000 MG: 1 INJECTION INTRAMUSCULAR; INTRAVENOUS; SUBCUTANEOUS at 01:10

## 2024-11-05 RX ADMIN — ACETAMINOPHEN 650 MG: 325 TABLET ORAL at 08:37

## 2024-11-05 ASSESSMENT — PAIN DESCRIPTION - LOCATION
LOCATION: HIP
LOCATION: HIP

## 2024-11-05 ASSESSMENT — PAIN SCALES - GENERAL: PAINLEVEL_OUTOF10: 7

## 2024-11-05 ASSESSMENT — PAIN DESCRIPTION - DESCRIPTORS: DESCRIPTORS: ACHING

## 2024-11-05 ASSESSMENT — PAIN DESCRIPTION - ORIENTATION
ORIENTATION: RIGHT
ORIENTATION: RIGHT

## 2024-11-05 NOTE — CONSULTS
ORTHOPEDIC SURGERY CONSULT    Subjective:     Date of Consultation:  November 5, 2024    Perfecto You is a 79 y.o. male who is being seen for pain in right buttock. Pain has been present since playing golf on Saturday.  He reports pain 6/10 at rest. He typically plays golf 3 days/week and is normally able to walk 18 holes. Several weeks ago he started having bilateral knee pain which ultimately forced him to start using a cart during his rounds. His pain then moved to the right buttock area. He had difficulty walking secondary to pain and presented to the ER in the early morning hours of 11/3/24. At that time he was feeling ill and reports diaphoresis. He was found to have leukocytosis (23.3) and blood cultures were positive for staph. He was admitted to medicine and started on empiric IV antibiotics. His WBC has improved to 8.0 but he continues to complain of pain in the right buttock. He denies radiating pain into the groin or radiculopathy-type symptoms. Xrays of the hip were negative. A source for his septicemia has not been identified. He has been evaluated by ID who raised concern for septic right hip. Orthopedic surgery has been consulted by the medical service to evaluate.    Patient Active Problem List    Diagnosis Date Noted    Hyperbilirubinemia 11/04/2024    MSSA bacteremia 11/04/2024    Sepsis due to methicillin susceptible Staphylococcus aureus (MSSA) with encephalopathy without septic shock (HCC) 11/04/2024    Leukocytosis 11/04/2024    Abdominal pain 11/03/2024     No family history on file.   Social History     Tobacco Use    Smoking status: Not on file    Smokeless tobacco: Not on file   Substance Use Topics    Alcohol use: Not on file     No past medical history on file.   No past surgical history on file.   Prior to Admission medications    Medication Sig Start Date End Date Taking? Authorizing Provider   famotidine (PEPCID) 20 MG tablet Take 1 tablet by mouth 2 times daily 11/3/24  Yes Ami 
9.6 cm/s    MV PHT 68.3 ms    MV Area by PHT 3.2 cm2    PV Peak Gradient 2 mmHg    PV Max Velocity 0.6 m/s    TAPSE 2.6 1.7 cm    TR Peak Gradient 36 mmHg    TR Max Velocity 2.98 m/s    Aortic Root 4.1 cm    Fractional Shortening 2D 35 28 - 44 %    LVIDd Index 2.52 cm/m2    LVIDs Index 1.64 cm/m2    LV RWT Ratio 0.41     LV Mass 2D 220.6 88 - 224 g    LV Mass 2D Index 103.1 49 - 115 g/m2    MV E/A 0.92     E/E' Ratio (Averaged) 10.76     E/E' Lateral 11.11     E/E' Septal 10.42     LVOT Area 3.5 cm2    LA Size Index 2.34 cm/m2    LA/AO Root Ratio 1.22     Ao Root Index 1.92 cm/m2    AV Velocity Ratio 0.77     JERRY/BSA Peak Velocity 1.3 cm2/m2    EF Physician 55 %   CBC with Auto Differential    Collection Time: 11/05/24  6:20 AM   Result Value Ref Range    WBC 8.0 4.1 - 11.1 K/uL    RBC 3.85 (L) 4.10 - 5.70 M/uL    Hemoglobin 12.7 12.1 - 17.0 g/dL    Hematocrit 37.2 36.6 - 50.3 %    MCV 96.6 80.0 - 99.0 FL    MCH 33.0 26.0 - 34.0 PG    MCHC 34.1 30.0 - 36.5 g/dL    RDW 12.7 11.5 - 14.5 %    Platelets 126 (L) 150 - 400 K/uL    MPV 10.5 8.9 - 12.9 FL    Nucleated RBCs 0.0 0  WBC    nRBC 0.00 0.00 - 0.01 K/uL    Neutrophils % 81 (H) 32 - 75 %    Band Neutrophils 7 (H) 0 - 6 %    Lymphocytes % 4 (L) 12 - 49 %    Monocytes % 7 5 - 13 %    Eosinophils % 1 0 - 7 %    Basophils % 0 0 - 1 %    Immature Granulocytes % 0 %    Neutrophils Absolute 7.0 1.8 - 8.0 K/UL    Lymphocytes Absolute 0.3 (L) 0.8 - 3.5 K/UL    Monocytes Absolute 0.6 0.0 - 1.0 K/UL    Eosinophils Absolute 0.1 0.0 - 0.4 K/UL    Basophils Absolute 0.0 0.0 - 0.1 K/UL    Immature Granulocytes Absolute 0.0 K/UL    Differential Type MANUAL      RBC Comment NORMOCYTIC, NORMOCHROMIC     Comprehensive Metabolic Panel    Collection Time: 11/05/24  6:20 AM   Result Value Ref Range    Sodium 140 136 - 145 mmol/L    Potassium 4.0 3.5 - 5.1 mmol/L    Chloride 109 (H) 97 - 108 mmol/L    CO2 27 21 - 32 mmol/L    Anion Gap 4 2 - 12 mmol/L    Glucose 106 (H) 65 - 100 
or hydronephrosis. Small bilateral renal cysts. STOMACH: Unremarkable. SMALL BOWEL: No dilatation or wall thickening. COLON: No dilatation or wall thickening. Colonic diverticulosis. APPENDIX: Unremarkable. PERITONEUM: No ascites or pneumoperitoneum. RETROPERITONEUM: No lymphadenopathy or aortic aneurysm. The origins of the celiac axis, SMA, single renal arteries bilaterally, and MAXIMINO are widely patent. REPRODUCTIVE ORGANS: The prostate is enlarged, measuring 6.0 cm. URINARY BLADDER: No mass or calculus. BONES: Degenerative changes are seen in the lumbar spine. ADDITIONAL COMMENTS: Small periumbilical hernia contains only fat.     No evidence of aortic aneurysm or dissection. No acute abnormality. Colonic diverticulosis. Prostate enlargement. Electronically signed by TIM PAK      Thank you for the opportunity to participate in the care of this patient. Please contact with questions or concerns.     The above plan of care that has been discussed and agreed upon by Dr. Parekh.    Signed By: SHAY Leon NP     November 4, 2024      A total time of 25 minutes was spent on today's encounter.  Greater than 50% of the time was spent on the following:  Preparing for visit and chart review.  Obtaining and/or reviewing separately obtained history  Performing a medically appropriate exam and/or evaluation  Counseling and educating a patient/family/caregiver as noted above  Referring and communicating with other professionals (not separately reported)  Independently interpreting results (not separately reported) and communicating results to the patient/family/caregiver  Care coordination (not separately reported) as noted above  Documenting clinical information in the electronic health records (e.g. problem list, visit note) on the day of the encounter

## 2024-11-06 ENCOUNTER — ANESTHESIA (OUTPATIENT)
Facility: HOSPITAL | Age: 79
DRG: 548 | End: 2024-11-06
Payer: MEDICARE

## 2024-11-06 ENCOUNTER — APPOINTMENT (OUTPATIENT)
Facility: HOSPITAL | Age: 79
DRG: 548 | End: 2024-11-06
Payer: MEDICARE

## 2024-11-06 ENCOUNTER — HOSPITAL ENCOUNTER (INPATIENT)
Facility: HOSPITAL | Age: 79
Discharge: HOME | DRG: 548 | End: 2024-11-08
Attending: INTERNAL MEDICINE
Payer: MEDICARE

## 2024-11-06 ENCOUNTER — ANESTHESIA EVENT (OUTPATIENT)
Facility: HOSPITAL | Age: 79
DRG: 548 | End: 2024-11-06
Payer: MEDICARE

## 2024-11-06 VITALS
TEMPERATURE: 98.3 F | HEART RATE: 69 BPM | SYSTOLIC BLOOD PRESSURE: 122 MMHG | DIASTOLIC BLOOD PRESSURE: 76 MMHG | OXYGEN SATURATION: 94 % | RESPIRATION RATE: 20 BRPM

## 2024-11-06 LAB
ALBUMIN SERPL-MCNC: 2.3 G/DL (ref 3.5–5)
ALBUMIN/GLOB SERPL: 0.6 (ref 1.1–2.2)
ALP SERPL-CCNC: 71 U/L (ref 45–117)
ALT SERPL-CCNC: 24 U/L (ref 12–78)
ANION GAP SERPL CALC-SCNC: 5 MMOL/L (ref 2–12)
AST SERPL-CCNC: 23 U/L (ref 15–37)
BACTERIA SPEC CULT: ABNORMAL
BASOPHILS # BLD: 0 K/UL (ref 0–0.1)
BASOPHILS NFR BLD: 0 % (ref 0–1)
BILIRUB SERPL-MCNC: 0.8 MG/DL (ref 0.2–1)
BUN SERPL-MCNC: 21 MG/DL (ref 6–20)
BUN/CREAT SERPL: 22 (ref 12–20)
CALCIUM SERPL-MCNC: 8.8 MG/DL (ref 8.5–10.1)
CHLORIDE SERPL-SCNC: 109 MMOL/L (ref 97–108)
CO2 SERPL-SCNC: 27 MMOL/L (ref 21–32)
CREAT SERPL-MCNC: 0.94 MG/DL (ref 0.7–1.3)
DIFFERENTIAL METHOD BLD: ABNORMAL
EOSINOPHIL # BLD: 0 K/UL (ref 0–0.4)
EOSINOPHIL NFR BLD: 0 % (ref 0–7)
ERYTHROCYTE [DISTWIDTH] IN BLOOD BY AUTOMATED COUNT: 12.7 % (ref 11.5–14.5)
GLOBULIN SER CALC-MCNC: 4 G/DL (ref 2–4)
GLUCOSE SERPL-MCNC: 110 MG/DL (ref 65–100)
HCT VFR BLD AUTO: 34.8 % (ref 36.6–50.3)
HGB BLD-MCNC: 12 G/DL (ref 12.1–17)
IMM GRANULOCYTES # BLD AUTO: 0 K/UL
IMM GRANULOCYTES NFR BLD AUTO: 0 %
LYMPHOCYTES # BLD: 0.7 K/UL (ref 0.8–3.5)
LYMPHOCYTES NFR BLD: 9 % (ref 12–49)
MCH RBC QN AUTO: 33.1 PG (ref 26–34)
MCHC RBC AUTO-ENTMCNC: 34.5 G/DL (ref 30–36.5)
MCV RBC AUTO: 96.1 FL (ref 80–99)
MONOCYTES # BLD: 1 K/UL (ref 0–1)
MONOCYTES NFR BLD: 12 % (ref 5–13)
NEUTS SEG # BLD: 6.3 K/UL (ref 1.8–8)
NEUTS SEG NFR BLD: 79 % (ref 32–75)
NRBC # BLD: 0 K/UL (ref 0–0.01)
NRBC BLD-RTO: 0 PER 100 WBC
PLATELET # BLD AUTO: 135 K/UL (ref 150–400)
PMV BLD AUTO: 10.4 FL (ref 8.9–12.9)
POTASSIUM SERPL-SCNC: 3.5 MMOL/L (ref 3.5–5.1)
PROT SERPL-MCNC: 6.3 G/DL (ref 6.4–8.2)
RBC # BLD AUTO: 3.62 M/UL (ref 4.1–5.7)
RBC MORPH BLD: ABNORMAL
SERVICE CMNT-IMP: ABNORMAL
SERVICE CMNT-IMP: ABNORMAL
SODIUM SERPL-SCNC: 141 MMOL/L (ref 136–145)
WBC # BLD AUTO: 8 K/UL (ref 4.1–11.1)

## 2024-11-06 PROCEDURE — 6360000002 HC RX W HCPCS

## 2024-11-06 PROCEDURE — 6360000004 HC RX CONTRAST MEDICATION: Performed by: INTERNAL MEDICINE

## 2024-11-06 PROCEDURE — B24BZZ4 ULTRASONOGRAPHY OF HEART WITH AORTA, TRANSESOPHAGEAL: ICD-10-PCS | Performed by: INTERNAL MEDICINE

## 2024-11-06 PROCEDURE — 6370000000 HC RX 637 (ALT 250 FOR IP): Performed by: EMERGENCY MEDICINE

## 2024-11-06 PROCEDURE — 1100000000 HC RM PRIVATE

## 2024-11-06 PROCEDURE — 2580000003 HC RX 258: Performed by: NURSE PRACTITIONER

## 2024-11-06 PROCEDURE — 6360000002 HC RX W HCPCS: Performed by: NURSE PRACTITIONER

## 2024-11-06 PROCEDURE — A9579 GAD-BASE MR CONTRAST NOS,1ML: HCPCS | Performed by: INTERNAL MEDICINE

## 2024-11-06 PROCEDURE — 6370000000 HC RX 637 (ALT 250 FOR IP): Performed by: NURSE PRACTITIONER

## 2024-11-06 PROCEDURE — 6370000000 HC RX 637 (ALT 250 FOR IP)

## 2024-11-06 PROCEDURE — 85025 COMPLETE CBC W/AUTO DIFF WBC: CPT

## 2024-11-06 PROCEDURE — 3700000000 HC ANESTHESIA ATTENDED CARE

## 2024-11-06 PROCEDURE — 2500000003 HC RX 250 WO HCPCS: Performed by: NURSE ANESTHETIST, CERTIFIED REGISTERED

## 2024-11-06 PROCEDURE — 72197 MRI PELVIS W/O & W/DYE: CPT

## 2024-11-06 PROCEDURE — 6360000002 HC RX W HCPCS: Performed by: NURSE ANESTHETIST, CERTIFIED REGISTERED

## 2024-11-06 PROCEDURE — 99232 SBSQ HOSP IP/OBS MODERATE 35: CPT | Performed by: INTERNAL MEDICINE

## 2024-11-06 PROCEDURE — 80053 COMPREHEN METABOLIC PANEL: CPT

## 2024-11-06 PROCEDURE — 93312 ECHO TRANSESOPHAGEAL: CPT

## 2024-11-06 RX ORDER — LORAZEPAM 2 MG/ML
1 INJECTION INTRAMUSCULAR ONCE
Status: COMPLETED | OUTPATIENT
Start: 2024-11-06 | End: 2024-11-06

## 2024-11-06 RX ORDER — LIDOCAINE HYDROCHLORIDE 20 MG/ML
INJECTION, SOLUTION EPIDURAL; INFILTRATION; INTRACAUDAL; PERINEURAL
Status: DISCONTINUED | OUTPATIENT
Start: 2024-11-06 | End: 2024-11-06 | Stop reason: SDUPTHER

## 2024-11-06 RX ORDER — DIPHENHYDRAMINE HYDROCHLORIDE, ZINC ACETATE 2; .1 G/100G; G/100G
CREAM TOPICAL 3 TIMES DAILY PRN
Status: DISCONTINUED | OUTPATIENT
Start: 2024-11-06 | End: 2024-11-07 | Stop reason: HOSPADM

## 2024-11-06 RX ORDER — LIDOCAINE HYDROCHLORIDE 20 MG/ML
INJECTION, SOLUTION EPIDURAL; INFILTRATION; INTRACAUDAL; PERINEURAL
Status: COMPLETED
Start: 2024-11-06 | End: 2024-11-06

## 2024-11-06 RX ORDER — TRAMADOL HYDROCHLORIDE 50 MG/1
50 TABLET ORAL EVERY 6 HOURS PRN
Status: DISCONTINUED | OUTPATIENT
Start: 2024-11-06 | End: 2024-11-07 | Stop reason: HOSPADM

## 2024-11-06 RX ORDER — SODIUM CHLORIDE, SODIUM LACTATE, POTASSIUM CHLORIDE, AND CALCIUM CHLORIDE .6; .31; .03; .02 G/100ML; G/100ML; G/100ML; G/100ML
1000 INJECTION, SOLUTION INTRAVENOUS ONCE
Status: DISCONTINUED | OUTPATIENT
Start: 2024-11-06 | End: 2024-11-06

## 2024-11-06 RX ORDER — PROPOFOL 10 MG/ML
INJECTION, EMULSION INTRAVENOUS
Status: COMPLETED
Start: 2024-11-06 | End: 2024-11-06

## 2024-11-06 RX ADMIN — LORAZEPAM 1 MG: 2 INJECTION INTRAMUSCULAR; INTRAVENOUS at 15:31

## 2024-11-06 RX ADMIN — DICLOFENAC SODIUM TOPICAL GEL, 1% 4 G: 10 GEL TOPICAL at 02:20

## 2024-11-06 RX ADMIN — DICLOFENAC SODIUM TOPICAL GEL, 1% 4 G: 10 GEL TOPICAL at 22:33

## 2024-11-06 RX ADMIN — WATER 2000 MG: 1 INJECTION INTRAMUSCULAR; INTRAVENOUS; SUBCUTANEOUS at 17:58

## 2024-11-06 RX ADMIN — DICLOFENAC SODIUM TOPICAL GEL, 1% 4 G: 10 GEL TOPICAL at 11:31

## 2024-11-06 RX ADMIN — QUETIAPINE FUMARATE 25 MG: 25 TABLET ORAL at 22:28

## 2024-11-06 RX ADMIN — TRAMADOL HYDROCHLORIDE 50 MG: 50 TABLET ORAL at 08:22

## 2024-11-06 RX ADMIN — LIDOCAINE HYDROCHLORIDE 50 MG: 20 INJECTION, SOLUTION EPIDURAL; INFILTRATION; INTRACAUDAL; PERINEURAL at 09:57

## 2024-11-06 RX ADMIN — PROPOFOL 25 MG: 10 INJECTION, EMULSION INTRAVENOUS at 09:59

## 2024-11-06 RX ADMIN — PROPOFOL 25 MG: 10 INJECTION, EMULSION INTRAVENOUS at 10:01

## 2024-11-06 RX ADMIN — ENOXAPARIN SODIUM 40 MG: 100 INJECTION SUBCUTANEOUS at 11:28

## 2024-11-06 RX ADMIN — PROPOFOL 50 MG: 10 INJECTION, EMULSION INTRAVENOUS at 09:57

## 2024-11-06 RX ADMIN — TRAMADOL HYDROCHLORIDE 50 MG: 50 TABLET ORAL at 15:27

## 2024-11-06 RX ADMIN — GADOTERIDOL 20 ML: 279.3 INJECTION, SOLUTION INTRAVENOUS at 16:45

## 2024-11-06 RX ADMIN — PROPOFOL 25 MG: 10 INJECTION, EMULSION INTRAVENOUS at 09:58

## 2024-11-06 RX ADMIN — FAMOTIDINE 20 MG: 20 TABLET, FILM COATED ORAL at 11:29

## 2024-11-06 RX ADMIN — FAMOTIDINE 20 MG: 20 TABLET, FILM COATED ORAL at 22:28

## 2024-11-06 RX ADMIN — DIPHENHYDRAMINE HYDROCHLORIDE, ZINC ACETATE: 2; .1 CREAM TOPICAL at 07:26

## 2024-11-06 RX ADMIN — VERAPAMIL HYDROCHLORIDE 180 MG: 180 TABLET, FILM COATED, EXTENDED RELEASE ORAL at 22:28

## 2024-11-06 RX ADMIN — WATER 2000 MG: 1 INJECTION INTRAMUSCULAR; INTRAVENOUS; SUBCUTANEOUS at 02:14

## 2024-11-06 RX ADMIN — SODIUM CHLORIDE, PRESERVATIVE FREE 10 ML: 5 INJECTION INTRAVENOUS at 22:33

## 2024-11-06 RX ADMIN — TAMSULOSIN HYDROCHLORIDE 0.4 MG: 0.4 CAPSULE ORAL at 19:27

## 2024-11-06 RX ADMIN — LORAZEPAM 1 MG: 2 INJECTION INTRAMUSCULAR; INTRAVENOUS at 16:30

## 2024-11-06 RX ADMIN — LOSARTAN POTASSIUM 100 MG: 50 TABLET, FILM COATED ORAL at 11:29

## 2024-11-06 RX ADMIN — SODIUM CHLORIDE, PRESERVATIVE FREE 10 ML: 5 INJECTION INTRAVENOUS at 11:29

## 2024-11-06 RX ADMIN — WATER 2000 MG: 1 INJECTION INTRAMUSCULAR; INTRAVENOUS; SUBCUTANEOUS at 11:35

## 2024-11-06 ASSESSMENT — PAIN SCALES - WONG BAKER
WONGBAKER_NUMERICALRESPONSE: NO HURT
WONGBAKER_NUMERICALRESPONSE: NO HURT

## 2024-11-06 ASSESSMENT — PAIN DESCRIPTION - DESCRIPTORS
DESCRIPTORS: ACHING
DESCRIPTORS: ACHING;DISCOMFORT;DULL

## 2024-11-06 ASSESSMENT — PAIN DESCRIPTION - LOCATION
LOCATION: HIP
LOCATION: HIP
LOCATION: BUTTOCKS

## 2024-11-06 ASSESSMENT — PAIN SCALES - GENERAL
PAINLEVEL_OUTOF10: 1
PAINLEVEL_OUTOF10: 6
PAINLEVEL_OUTOF10: 10
PAINLEVEL_OUTOF10: 1
PAINLEVEL_OUTOF10: 5

## 2024-11-06 ASSESSMENT — PAIN DESCRIPTION - ORIENTATION
ORIENTATION: RIGHT;LEFT
ORIENTATION: RIGHT
ORIENTATION: RIGHT

## 2024-11-06 NOTE — ANESTHESIA POSTPROCEDURE EVALUATION
Department of Anesthesiology  Postprocedure Note    Patient: Perfecto You  MRN: [id number]  YOB: 1945  Date of evaluation: 11/6/2024    Procedure Summary       Date: 11/06/24 Room / Location: [facility] NON-INVASIVE CARDIOLOGY    Anesthesia Start: 0951 Anesthesia Stop: 1004    Procedure: ALMA (PRN CONTRAST/BUBBLE/3D) Diagnosis: Bacteremia    Scheduled Providers: Elver Falcon MD Responsible Provider: Tatum Larson DO    Anesthesia Type: MAC ASA Status: 2            Anesthesia Type: MAC    Naida Phase I: Naida Score: 10    Naida Phase II:      Anesthesia Post Evaluation    Patient location during evaluation: PACU  Patient participation: complete - patient participated  Level of consciousness: awake and alert  Pain score: 0  Airway patency: patent  Nausea & Vomiting: no nausea and no vomiting  Cardiovascular status: blood pressure returned to baseline and hemodynamically stable  Respiratory status: acceptable and room air  Hydration status: euvolemic  Multimodal analgesia pain management approach  Pain management: adequate and satisfactory to patient    No notable events documented.

## 2024-11-06 NOTE — PROGRESS NOTES
TRANSFER - OUT REPORT:    Verbal report given to SINCERE Monroy(name) on Perfecto Yuo being transferred to Ortho(unit) for routine progression of patient care       Report consisted of patient's Situation, Background, Assessment and   Recommendations(SBAR).     Information from the following report(s) Adult Overview, Intake/Output, MAR, Recent Results, and Cardiac Rhythm SR  was reviewed with the receiving nurse.    Opportunity for questions and clarification was provided.      Patient transported with:   Tech

## 2024-11-06 NOTE — CARE COORDINATION
JOHN:    Pt reports that his hip pain is unabated and that he was unable to complete his imaging yesterday as he was squirming too much. He reports being unable to walk at this time.     Pt lives in multistory home with wife. In the home, they have walkers and bedside commodes. Pt reports to be an active . CM confirmed face sheet. CM will follow for HH or other possible post d/c needs. MRI pending.     Care Management Initial Assessment       RUR:9%  Readmission? No  1st IM letter given? Yes    11/06/24 1998   Service Assessment   Patient Orientation Alert and Oriented   Cognition Alert   History Provided By Patient   Primary Caregiver Self   Support Systems Spouse/Significant Other   Patient's Healthcare Decision Maker is: Legal Next of Kin   PCP Verified by CM Yes   Prior Functional Level Independent in ADLs/IADLs   Current Functional Level Assistance with the following:;Mobility   Can patient return to prior living arrangement Unknown at present   Ability to make needs known: Good   Family able to assist with home care needs: Yes   Would you like for me to discuss the discharge plan with any other family members/significant others, and if so, who? Yes   Social/Functional History   Type of Home House   Active  Yes   Discharge Planning   Patient expects to be discharged to: House   Services At/After Discharge   Confirm Follow Up Transport Family   Condition of Participation: Discharge Planning   The Plan for Transition of Care is related to the following treatment goals: Home Health likely

## 2024-11-06 NOTE — CARE COORDINATION
JOHN:    CM attempted to meet with patient to conduct admission. Pt was solidly asleep. CM to try again later and/or when spouse present.     ID following, cultures pending    CM to follow for possible HH for PT and/or OT

## 2024-11-06 NOTE — ANESTHESIA PRE PROCEDURE
Department of Anesthesiology  Preprocedure Note       Name:  Perfecto You   Age:  79 y.o.  :  1945                                          MRN:  [id number]         Date:  2024      Surgeon: * Surgery not found *    Procedure:     Medications prior to admission:   Prior to Admission medications    Medication Sig Start Date End Date Taking? Authorizing Provider   famotidine (PEPCID) 20 MG tablet Take 1 tablet by mouth 2 times daily 11/3/24   Prince Mueller MD   acetaminophen (TYLENOL) 500 MG tablet Take 2 tablets by mouth 3 times daily as needed for Pain 11/3/24   Prince Mueller MD       Current medications:    No current facility-administered medications for this encounter.     Current Outpatient Medications   Medication Sig Dispense Refill   • famotidine (PEPCID) 20 MG tablet Take 1 tablet by mouth 2 times daily 30 tablet 0   • acetaminophen (TYLENOL) 500 MG tablet Take 2 tablets by mouth 3 times daily as needed for Pain 30 tablet 0     Facility-Administered Medications Ordered in Other Encounters   Medication Dose Route Frequency Provider Last Rate Last Admin   • diphenhydrAMINE-zinc acetate 2-0.1 % cream   Topical TID PRN Abby Prieto APRN - NP   Given at 24 0726   • traMADol (ULTRAM) tablet 50 mg  50 mg Oral Q6H PRN Haleigh Workman PA-C   50 mg at 24 0822   • lactated ringers bolus 1,000 mL  1,000 mL IntraVENous Once Haleigh Workman PA-C       • LORazepam (ATIVAN) injection 1 mg  1 mg IntraVENous Once Haleigh Workman PA-C       • diclofenac sodium (VOLTAREN) 1 % gel 4 g  4 g Topical BID Haleigh Workman PA-C   4 g at 24 0220   • gadoteridol (PROHANCE) injection 20 mL  20 mL IntraVENous ONCE PRN Oscar Toledo MD       • lidocaine 4 % external patch 1 patch  1 patch TransDERmal Q24H Abby Prieto APRN - NP   1 patch at 24 2202   • ceFAZolin (ANCEF) 2,000 mg in sterile water 20 mL IV syringe  2,000 mg IntraVENous Q8H Tho Dumas APRN - NP   2,000 mg at 24

## 2024-11-06 NOTE — PROGRESS NOTES
TRANSFER - IN REPORT:    Verbal report received from SINCERE Monroy(name) on Perfecto You  being received from Ortho(unit) for ordered procedure      Report consisted of patient’s Situation, Background, Assessment and   Recommendations(SBAR).     Information from the following report(s) Adult Overview, Intake/Output, MAR, and Recent Results was reviewed with the receiving nurse.    Opportunity for questions and clarification was provided.      Assessment completed upon patient’s arrival to unit and care assume

## 2024-11-07 ENCOUNTER — TELEPHONE (OUTPATIENT)
Facility: HOSPITAL | Age: 79
End: 2024-11-07

## 2024-11-07 VITALS
WEIGHT: 208 LBS | BODY MASS INDEX: 29.12 KG/M2 | HEART RATE: 77 BPM | TEMPERATURE: 98.2 F | HEIGHT: 71 IN | RESPIRATION RATE: 16 BRPM | OXYGEN SATURATION: 96 % | DIASTOLIC BLOOD PRESSURE: 78 MMHG | SYSTOLIC BLOOD PRESSURE: 125 MMHG

## 2024-11-07 PROBLEM — M46.58 SEPTIC ARTHRITIS OF SACROILIAC JOINT (HCC): Status: ACTIVE | Noted: 2024-11-07

## 2024-11-07 PROBLEM — M00.051 STAPHYLOCOCCAL ARTHRITIS OF RIGHT HIP: Status: ACTIVE | Noted: 2024-11-07

## 2024-11-07 LAB
ANION GAP SERPL CALC-SCNC: 7 MMOL/L (ref 2–12)
BUN SERPL-MCNC: 23 MG/DL (ref 6–20)
BUN/CREAT SERPL: 28 (ref 12–20)
CALCIUM SERPL-MCNC: 8.4 MG/DL (ref 8.5–10.1)
CHLORIDE SERPL-SCNC: 107 MMOL/L (ref 97–108)
CO2 SERPL-SCNC: 25 MMOL/L (ref 21–32)
CREAT SERPL-MCNC: 0.82 MG/DL (ref 0.7–1.3)
ECHO BSA: 2.17 M2
ECHO LV EF PHYSICIAN: 40 %
ERYTHROCYTE [DISTWIDTH] IN BLOOD BY AUTOMATED COUNT: 13.1 % (ref 11.5–14.5)
GLUCOSE SERPL-MCNC: 88 MG/DL (ref 65–100)
HCT VFR BLD AUTO: 35.4 % (ref 36.6–50.3)
HGB BLD-MCNC: 12.2 G/DL (ref 12.1–17)
MCH RBC QN AUTO: 33.1 PG (ref 26–34)
MCHC RBC AUTO-ENTMCNC: 34.5 G/DL (ref 30–36.5)
MCV RBC AUTO: 95.9 FL (ref 80–99)
NRBC # BLD: 0 K/UL (ref 0–0.01)
NRBC BLD-RTO: 0 PER 100 WBC
PLATELET # BLD AUTO: 141 K/UL (ref 150–400)
PMV BLD AUTO: 10.1 FL (ref 8.9–12.9)
POTASSIUM SERPL-SCNC: 3.6 MMOL/L (ref 3.5–5.1)
RBC # BLD AUTO: 3.69 M/UL (ref 4.1–5.7)
SODIUM SERPL-SCNC: 139 MMOL/L (ref 136–145)
WBC # BLD AUTO: 7.4 K/UL (ref 4.1–11.1)

## 2024-11-07 PROCEDURE — 2709999900 HC NON-CHARGEABLE SUPPLY

## 2024-11-07 PROCEDURE — 02HV33Z INSERTION OF INFUSION DEVICE INTO SUPERIOR VENA CAVA, PERCUTANEOUS APPROACH: ICD-10-PCS | Performed by: ORTHOPAEDIC SURGERY

## 2024-11-07 PROCEDURE — 76937 US GUIDE VASCULAR ACCESS: CPT

## 2024-11-07 PROCEDURE — 2580000003 HC RX 258: Performed by: NURSE PRACTITIONER

## 2024-11-07 PROCEDURE — 6360000002 HC RX W HCPCS: Performed by: NURSE PRACTITIONER

## 2024-11-07 PROCEDURE — 36569 INSJ PICC 5 YR+ W/O IMAGING: CPT

## 2024-11-07 PROCEDURE — 99233 SBSQ HOSP IP/OBS HIGH 50: CPT | Performed by: NURSE PRACTITIONER

## 2024-11-07 PROCEDURE — APPSS15 APP SPLIT SHARED TIME 0-15 MINUTES: Performed by: NURSE PRACTITIONER

## 2024-11-07 PROCEDURE — 99232 SBSQ HOSP IP/OBS MODERATE 35: CPT | Performed by: PHYSICIAN ASSISTANT

## 2024-11-07 PROCEDURE — C1751 CATH, INF, PER/CENT/MIDLINE: HCPCS

## 2024-11-07 PROCEDURE — 85027 COMPLETE CBC AUTOMATED: CPT

## 2024-11-07 PROCEDURE — 6370000000 HC RX 637 (ALT 250 FOR IP): Performed by: EMERGENCY MEDICINE

## 2024-11-07 PROCEDURE — 6370000000 HC RX 637 (ALT 250 FOR IP)

## 2024-11-07 PROCEDURE — 80048 BASIC METABOLIC PNL TOTAL CA: CPT

## 2024-11-07 RX ORDER — TAMSULOSIN HYDROCHLORIDE 0.4 MG/1
1 CAPSULE ORAL DAILY
COMMUNITY
Start: 2024-10-21

## 2024-11-07 RX ORDER — LIDOCAINE 4 G/G
1 PATCH TOPICAL EVERY 24 HOURS
Qty: 1 EACH | Refills: 0 | Status: SHIPPED | OUTPATIENT
Start: 2024-11-07

## 2024-11-07 RX ORDER — VERAPAMIL HYDROCHLORIDE 180 MG/1
180 TABLET, EXTENDED RELEASE ORAL NIGHTLY
COMMUNITY
Start: 2024-01-04

## 2024-11-07 RX ORDER — BACILLUS COAGULANS/INULIN 1B-250 MG
1 CAPSULE ORAL DAILY
Qty: 60 CAPSULE | Refills: 0 | Status: SHIPPED | OUTPATIENT
Start: 2024-11-07 | End: 2025-01-06

## 2024-11-07 RX ORDER — LOSARTAN POTASSIUM 100 MG/1
100 TABLET ORAL DAILY
COMMUNITY
Start: 2024-01-22

## 2024-11-07 RX ORDER — TRAMADOL HYDROCHLORIDE 50 MG/1
50 TABLET ORAL EVERY 6 HOURS PRN
Qty: 20 TABLET | Refills: 0 | Status: SHIPPED | OUTPATIENT
Start: 2024-11-07 | End: 2024-11-12

## 2024-11-07 RX ADMIN — DICLOFENAC SODIUM TOPICAL GEL, 1% 4 G: 10 GEL TOPICAL at 08:34

## 2024-11-07 RX ADMIN — WATER 2000 MG: 1 INJECTION INTRAMUSCULAR; INTRAVENOUS; SUBCUTANEOUS at 08:34

## 2024-11-07 RX ADMIN — LOSARTAN POTASSIUM 100 MG: 50 TABLET, FILM COATED ORAL at 08:34

## 2024-11-07 RX ADMIN — FAMOTIDINE 20 MG: 20 TABLET, FILM COATED ORAL at 08:34

## 2024-11-07 RX ADMIN — TRAMADOL HYDROCHLORIDE 50 MG: 50 TABLET ORAL at 15:23

## 2024-11-07 RX ADMIN — ENOXAPARIN SODIUM 40 MG: 100 INJECTION SUBCUTANEOUS at 08:34

## 2024-11-07 RX ADMIN — SODIUM CHLORIDE, PRESERVATIVE FREE 10 ML: 5 INJECTION INTRAVENOUS at 08:35

## 2024-11-07 RX ADMIN — WATER 2000 MG: 1 INJECTION INTRAMUSCULAR; INTRAVENOUS; SUBCUTANEOUS at 02:08

## 2024-11-07 NOTE — DISCHARGE SUMMARY
breath while lying flat in bed.  Please call your doctor as soon as you notice any of these symptoms; do not wait until your next office visit.        The discharge information has been reviewed with the patient and spouse.  The patient and spouse verbalized understanding.  Discharge medications reviewed with the patient and spouse and appropriate educational materials and side effects teaching were provided.  ___________________________________________________________________________________________________________________________________

## 2024-11-07 NOTE — PLAN OF CARE
Problem: Discharge Planning  Goal: Discharge to home or other facility with appropriate resources  11/4/2024 2305 by Tad Connell RN  Outcome: Progressing  Flowsheets (Taken 11/4/2024 2130)  Discharge to home or other facility with appropriate resources: Identify barriers to discharge with patient and caregiver  11/4/2024 1104 by Alok Lugo RN  Outcome: Progressing  Flowsheets (Taken 11/3/2024 2351 by Katelyn Billings, RN)  Discharge to home or other facility with appropriate resources: Identify barriers to discharge with patient and caregiver     Problem: Pain  Goal: Verbalizes/displays adequate comfort level or baseline comfort level  11/4/2024 2305 by Tad Connell RN  Outcome: Progressing  11/4/2024 1104 by Alok Lugo RN  Outcome: Progressing     Problem: Safety - Adult  Goal: Free from fall injury  11/4/2024 2305 by Tad Connell RN  Outcome: Progressing  11/4/2024 1104 by Alok Lugo RN  Outcome: Progressing     Problem: Safety - Medical Restraint  Goal: Remains free of injury from restraints (Restraint for Interference with Medical Device)  Description: INTERVENTIONS:  1. Determine that other, less restrictive measures have been tried or would not be effective before applying the restraint  2. Evaluate the patient's condition at the time of restraint application  3. Inform patient/family regarding the reason for restraint  4. Q2H: Monitor safety, psychosocial status, comfort, nutrition and hydration  11/4/2024 2305 by aTd Connell RN  Outcome: Progressing  11/4/2024 1104 by Alok Lugo RN  Outcome: Progressing  Flowsheets (Taken 11/3/2024 2252 by Leslie Levi, RN)  Remains free of injury from restraints (restraint for interference with medical device):   Determine that other, less restrictive measures have been tried or would not be effective before applying the restraint   Evaluate the patient's condition at the time of restraint application   Inform patient/family regarding the reason for 
  Problem: Discharge Planning  Goal: Discharge to home or other facility with appropriate resources  11/6/2024 1425 by Eliana Carey LPN  Outcome: Progressing  11/6/2024 1424 by Eliana Carey LPN  Outcome: Progressing     
  Problem: Discharge Planning  Goal: Discharge to home or other facility with appropriate resources  Outcome: Progressing     
  Problem: Discharge Planning  Goal: Discharge to home or other facility with appropriate resources  Outcome: Progressing     Problem: Pain  Goal: Verbalizes/displays adequate comfort level or baseline comfort level  Outcome: Progressing     Problem: Safety - Adult  Goal: Free from fall injury  Outcome: Progressing     
  Problem: Discharge Planning  Goal: Discharge to home or other facility with appropriate resources  Outcome: Progressing     Problem: Pain  Goal: Verbalizes/displays adequate comfort level or baseline comfort level  Outcome: Progressing     Problem: Safety - Adult  Goal: Free from fall injury  Outcome: Progressing     Problem: Safety - Medical Restraint  Goal: Remains free of injury from restraints (Restraint for Interference with Medical Device)  Description: INTERVENTIONS:  1. Determine that other, less restrictive measures have been tried or would not be effective before applying the restraint  2. Evaluate the patient's condition at the time of restraint application  3. Inform patient/family regarding the reason for restraint  4. Q2H: Monitor safety, psychosocial status, comfort, nutrition and hydration  Outcome: Progressing     
  Problem: Physical Therapy - Adult  Goal: By Discharge: Performs mobility at highest level of function for planned discharge setting.  See evaluation for individualized goals.  Description: FUNCTIONAL STATUS PRIOR TO ADMISSION: Patient was independent and active without use of DME. Retired. Plays golf multiple times per week.       HOME SUPPORT PRIOR TO ADMISSION: The patient lived with wife and daughter but did not require assistance. Son lives local.   Physical Therapy Goals  Initiated 11/4/2024  1.  Patient will move from supine to sit and sit to supine, scoot up and down, and roll side to side in bed with modified independence within 7 day(s).    2.  Patient will perform sit to stand with modified independence within 7 day(s).  3.  Patient will transfer from bed to chair and chair to bed with modified independence using the least restrictive device within 7 day(s).  4.  Patient will ambulate with modified independence for 150 feet with the least restrictive device within 7 day(s).   5.  Patient will ascend/descend 8 stairs with handrail(s) with modified independence within 7 day(s).    Outcome: Progressing   PHYSICAL THERAPY TREATMENT    Patient: Perfecto You (79 y.o. male)  Date: 11/5/2024  Diagnosis: Hyperbilirubinemia [E80.6]  Abdominal pain [R10.9]  Periumbilical abdominal pain [R10.33]  Leukocytosis, unspecified type [D72.829] Abdominal pain      Precautions: Fall Risk                      ASSESSMENT:  Patient continues to benefit from skilled PT services and is progressing towards goals. Patient presents in bed. He has minimal-no pain in right hip at rest, but it begins to hurt as soon as he moves or puts weight on it. It is noted that he is very tender to palpation over right gluteus medius belly. Patient experiences exquisite pain with \"clam shell\" exercise on right.  He performed bed mobility, transfers and gait with RW with contact guard assistance. He is unable to put FULL weight through RLE due to 
Discharge IV Antibiotic Order  [facility]  [address] [place] [id number]  TEL: [phone]  FAX: [phone]      1.  Diagnosis:  Right sacroiliac joint septic arthritis   2.  Antibiotic:  IV ancef 2 gm every 8 hours       END DATE: 12/16/2024  3.  Routine PICC/ Jenny/ Portacath Care including PRN catheter flow management  4.  Weekly labs:   [x] CBC/diff/platelets   [x] BUN/Creatinine    [] CPK. Please hold your cholesterol medication (name ends with STATIN) while you are taking daily Daptomycin.    [x]  AST/Total bilirubin/Alkaline Phosphatase    [x] CRP   []Trough Vancomycin level goal 15-20. Drawn every Monday and Thursday. Clinical pharmacy consult for Vancomycin dosing according to the trough level.   5.  Fax lab to [phone].  Call Critical Lab Values to [phone]  6.  May send to IR for line evaluation or replacement [phone]  7.  Home Health to pull PICC line at end of therapy or send to IR for Jenny removal  8.  Allergies:  No Known Allergies  9.  Recommend to follow up within 3-4 weeks        SHAY Leon NP      
Problem: Occupational Therapy - Adult  Goal: By Discharge: Performs self-care activities at highest level of function for planned discharge setting.  See evaluation for individualized goals.  Description: FUNCTIONAL STATUS PRIOR TO ADMISSION:  Pt is independent with BADLs at baseline without AE. Pt is very active and plays golf 3x/week and works out.   , ADL Assistance: Independent,  ,  ,  ,  ,  , Homemaking Assistance: Independent, Ambulation Assistance: Independent, Transfer Assistance: Independent, Active : Yes     HOME SUPPORT: Patient lives with his spouse in a multi-level house.    Occupational Therapy Goals:  Initiated 11/4/2024  1.  Patient will perform grooming standing at sink with Modified [place] within 7 day(s).  2.  Patient will perform lower body dressing with Modified [place] within 7 day(s).  3.  Patient will perform toilet transfers with Modified [place]  within 7 day(s).  4.  Patient will perform all aspects of toileting with Modified [place] within 7 day(s).  Outcome: Progressing   OCCUPATIONAL THERAPY EVALUATION    Patient: Perfecto You (79 y.o. male)  Date: 11/4/2024  Primary Diagnosis: Hyperbilirubinemia [E80.6]  Abdominal pain [R10.9]  Periumbilical abdominal pain [R10.33]  Leukocytosis, unspecified type [D72.829]         Precautions: Fall Risk                  ASSESSMENT :  Pt presented to the hospital after several days of reporting knee and abdominal pain per Pt report. The patient is limited by decreased functional mobility, independence in ADLs, high-level IADLs, strength, activity tolerance, safety awareness, cognition, attention/concentration, coordination, balance.    Based on the impairments listed above Pt is requiring up to [place] with BADLs for steadying balance and constant verbal cues for safety. Pt presented standing in room attempting to walk with difficulty. Educated on need for staff assistance and use of RW for safety. Pt agreeable but likely poor 
(ft): 40 Feet (x2)  Assistive Device: Walker, rolling;Gait belt  Interventions: Safety awareness training;Verbal cues  Base of Support: Narrowed  Speed/Yen: Pace decreased (< 100 feet/min)  Step Length: Right shortened  Swing Pattern: Right asymmetrical                                                                                                                                                                                                                                          [facility]®      Basic Mobility Inpatient Short Form (6-Clicks) Version 2    How much help is needed turning from your back to your side while in a flat bed without using bedrails?: None  How much help is needed moving from lying on your back to sitting on the side of a flat bed without using bedrails?: None  How much help is needed moving to and from a bed to a chair?: None  How much help is needed standing up from a chair using your arms?: None  How much help is needed walking in hospital room?: A Little  How much help is needed climbing 3-5 steps with a railing?: A Little    [facility] Inpatient Mobility Raw Score : 22  -PAC Inpatient T-Scale Score : 53.28     Cutoff score <=171,2,3 had higher odds of discharging home with home health or need of SNF/IPR.    1. Brandy Guerrero, Dov Hough, Paige Marley, Faisal Parsons, Ezio Guerrero.  Validity of the -PAC “6-Clicks” Inpatient Daily Activity and Basic Mobility Short Forms. Physical Therapy Mar 2014, 94 (3) 379-391; DOI: 10.2522/ptj.[id number]  2. Alvin CUBA, Susy J, Diana J, Venkat J. Association of AM-PAC \"6-Clicks\" Basic Mobility and Daily Activity Scores With Discharge Destination. Phys Ther. 2021 Apr 4;101(4):[id number]. doi: 10.1093/ptj/ezbc056. PMID: [id number].  3. Collin WYNN, Maribel STARK, Melba S, Dayne HALE, Pretty S. Activity Measure for Post-Acute Care \"6-Clicks\" Basic Mobility Scores Predict Discharge Destination After Acute

## 2024-11-07 NOTE — PROCEDURES
PROCEDURE NOTE  Date: 11/7/2024   Name: Perfecto You  YOB: 1945    PICC Placement Note    PRE-PROCEDURE VERIFICATION  Correct Procedure: yes  Correct Site:  yes  Temperature: Temp: 98.6 °F (37 °C), Temperature Source:    Recent Labs     11/07/24  0608   BUN 23*   *   WBC 7.4       Allergies: Patient has no known allergies.    Education materials, including PICC Booklet and written information regarding central catheter related bloodstream infection and prevention given to patient.   See Patient Education activity for further details.    PROCEDURE DETAIL  A single lumen power injectable PICC line was started for long term IV antibiotic therapy. The following documentation is in addition to the PICC properties in the lines/airways flowsheet :  Lot #: [id number]  Xylocaine 1% used intradermally: yes  Total Catheter Length:  41 (cm)  External Catheter Length: 0 (cm)  Circumference: 29 (cm)  Catheter to vein ratio: 25 %  Vein Selection for PICC: right basilic  Central Line Bundle followed: yes  Complication Related to Insertion: none    The placement was verified by ECG technology. The PICC is on the right side and the tip overlies the superior vena cava.  ECG verification documentation seen below.    Line is okay to use.  Report to nursing.    Regulo Sam, JOSE ENRIQUEN, RN, [place]-BC   Vascular Access Team

## 2024-11-07 NOTE — PROGRESS NOTES
[facility]  Hospitalist Group                                                                                          Hospitalist Progress Note  Haleigh Workman PA-C  Answering service: [phone] OR [phone] from in house phone        Date of Service:  2024  NAME:  Perfecto You  :  1945  MRN:  [id number]       Admission Summary:   Perfecto You is a 79 y.o. male with a past medical history significant for hypertension who reported to the emergency department on 11/3/2024 with complaints of severe abdominal pain.  His trouble started about 2 weeks ago after walking on the golf [place] developed right knee pain which 2 days prior to presentation developed into right hip pain. Overnight he developed severe abdominal pain and presented to the emergency department.  Further workup in the emergency department revealed leukocytosis, 23, BUN was mildly elevated at 28 however creatinine normal at 1.28.  Urinalysis was unremarkable,Chest radiograph was also unremarkable as was his AP CT.  Patient was then referred to the hospitalist service for further management.  Interval history / Subjective:   Upon seeing the patient on rounds, patient was resting in bed. Patient states he had a rough day yesterday, pain wise, states his right hip has increased in pain. Patient states he has no pain when palpating the hip, but when he moves his hip he has extreme pain. Patient states he was unable to receive his MRI yesterday due to pain in the hip. Patient also states he has a red non-itchy papules on his back. Denies having them anywhere else. Patient denies fevers, chills, SOB, CP and N/V/D. Patient has no further complaints at this time.     Assessment & Plan:     Sepsis/positive blood culture/bacteremia  Fever (Resolved)  Leukocytosis (Resolved)  -Procalcitonin 4.24  -WBC: 8.0  -CRP: 21.90  -Blood cultures (): Preliminary result Staphylococcus aureus in 4 bottles  -Repeat Blood Cultures (): 
        [facility] Adult  Hospitalist Group                                                                                          Hospitalist Progress Note  Haleigh Workman PA-C  Answering service: [phone] OR [phone] from in house phone        Date of Service:  2024  NAME:  Perfecto You  :  1945  MRN:  [id number]       Admission Summary:   Perfecto You is a 79 y.o. male with a past medical history significant for hypertension who reported to the emergency department on 11/3/2024 with complaints of severe abdominal pain.  His trouble started about 2 weeks ago after walking [place] the [place] developed right knee pain which 2 days prior to presentation developed into right hip pain. Overnight he developed severe abdominal pain and presented to the emergency department.  Further workup in the emergency department revealed leukocytosis, 23, BUN was mildly elevated at 28 however creatinine normal at 1.28.  Urinalysis was unremarkable,Chest radiograph was also unremarkable as was his AP CT.  Patient was then referred to the hospitalist service for further management.  Interval history / Subjective:   Upon seeing the patient on rounds, patient was resting in bed, comfortable. Wife at bedside. Patient states he feels well today and has no complaints besides right hip pain that is intermittent. Patient states this has been an issue for the past couple weeks where the pain comes and goes without warning. Patient states he has tried Tylenol and Motrin without alleviation. Patient denies ever having seen an orthopedic provider outpatient. Patient denied abdominal pain. Patient denies SOB, CP, abdominal pian, fevers, chills or N/V/D. Patient has no further complaints at this time.     Assessment & Plan:     Sepsis/positive blood culture/bacteremia  Fever (Resolved)  Leukocytosis (Resolved)  -Procalcitonin 4.24  -WBC: 8.0  -CRP: 21.90  -Blood cultures (): Preliminary result Staphylococcus aureus in 
1500: Patient arrived to floor. Assessment done - patient noted to have poor judgement and some flight of ideas with hospital stay and some slurring of words. Asked about possible day drinking and patient confessed to drinking 2 glasses of wine a day, and a beer if he does not drink wine. Bed alarm turned on.     1545: no changes, patient asleep in bed      1745: Doing rounding on patient, patient had incontinent episode since last rounded on him, with blankets off. Cleaned patient with HERNANDO Hairston, noticed patient had new tremor on Right hand while changing patient as well as RR had increased before cleaning patient. Reassessment done after cleaning patient and had apparent change in mental. Patient was confused at time stating it was \"1961,\" and he was in \"[place],\" in a \"Nursing home.\" Assessed patient vitals: Pulse 104 temperature 102.4 - code sepsis was called.     Cefepime given at 1834, Vanco given at 1844.    1845; Reassessment, patient Oriented to self and place and situation, not to time.  Vitals trending to normal.     
At 2330, nurse checked pt and pt wanted to talk his wife. Nurse helped pt to dial his wife. Pt promised his wife that he would stay in bed,  and his wife told nurse and  NP anju that she changed his mind and did not want her  on restraint. Pt's restraint was discontinued.  
Called Mac bearden Manager, Honey.  ([phone]) No answer so left a voice message about no call back from previous nurse as well as if able to locate patients phone from linen distrubution.   
Called linen destribution supervisor Ms. Riley at  and nobody answered the phone. Nurse left message to ms. Riley for helping pt to locate pt's phone.  
Day #1 of cefepime  Indication:  sepsis  Current regimen:  2g IV Q12H  Abx regimen: vanc + cefepime  Recent Labs     24  0200 24  1027   WBC 23.3*  --    CREATININE 1.28 1.05   BUN 28* 27*     Est CrCl: 65 ml/min; UO: not documented  Temp (24hrs), Av.7 °F (37.6 °C), Min:97.7 °F (36.5 °C), Max:102.4 °F (39.1 °C)    Cultures:   11/3 blood x 2 - NGTD    Plan: Change to 2g IV Q8H extended infusion per protocol  
Hospitalist cross coverage.    Code sepsis called at 1802. Fever 102F tachycardia low 100s. BP stable assessed patient at bedside with RRT awake alert, responsive, stat lactic is normal does not meet sepsis criteria code sepsis canceled, sirs+ though. Chart reviewed, PCT is elevated 4.24 but unrevealing infectious source (CTA abdomen was neg), UA neg, draw bld cx, also concerned about possible DVT vs PE vs any liver/gb/ductal pathology or obstruction though less likely. Start abx, vanco, cefepime given the elevated PCT and unclear etiology, ordered CTA chest ro PE or any other infectious source, le dopplers. Ordered RUQ US, already on IVF.     CRITICAL CARE ATTESTATION:  I had a face to face encounter with the patient, reviewed and interpreted patient data including clinical events, labs, images, vital signs, I/O's, and examined patient.  I have discussed the case and the plan and management of the patient's care with the consulting services, the bedside nurses and necessary ancillary providers.       NOTE OF PERSONAL INVOLVEMENT IN CARE   This patient has a high probability of imminent, clinically significant deterioration, which requires the highest level of preparedness to intervene urgently. I participated in the decision-making and personally managed or directed the management of the following life and organ supporting interventions that required my frequent assessment to treat or prevent imminent deterioration.     I personally spent 35 minutes of critical care time.  This is time spent at this critically ill patient's bedside actively involved in patient care as well as the coordination of care and discussions with the patient's family.  This does not include any procedural time which has been billed separately.         
Infectious Disease Progress     INFECTIOUS DISEASE Attending:     I agree with the above infectious disease daily progress note in its entirety as authored by and discussed in detail with the nurse practitioner.   I have reviewed pertinent laboratory studies, microbiology cultures, radiologic reports with review of the consultations and progress notes as appropriate.   I agree with today's subjective findings, physical examination, assessment and plan of care as described above and discussed extensively with the nurse practitioner.       6 week course of Cefazolin to complete.    If symptoms not improved after at least 2-3 more weeks of antibiotic therapy, then will need orthopedics re-evaluation for I/D for source control.    Erythema on back is most likely a heat rash and not an allergic reaction, encouraged further ambulation.    D/w primary, orthopedics.    A total time of 30 minutes was spent on today's encounter.  Greater than 50% of the time was spent on the following:  Preparing for visit and chart review.  Obtaining and/or reviewing separately obtained history  Performing a medically appropriate exam and/or evaluation  Counseling and educating a patient/family/caregiver as noted above  Placing relevant orders  Referring and communicating with other professionals (not separately reported)  Independently interpreting results (not separately reported) and communicating results to the patient/family/caregiver  Care coordination (not separately reported) as noted above  Documenting clinical information in the electronic health records (e.g. problem list, visit note) on the day of the encounter      Impression:   Bacteremia  Right sacroiliac joint septic arthritis  - afebrile, wbc 7.4    Blood cx (11/3) MSSA (11/5) no growth so far    U/A (-)    ALMA (-) for vegetation    MRI of right hip (11/6)  Findings compatible with RIGHT sacroiliac joint septic arthritis (please  note, the joint fluid is too superior and 
Infectious Disease Progress Note    Today's Date: 11/6/2024   Admit Date: 11/3/2024    Date of Consultation:  November 6, 2024  Reason for consult:Bacteremia  Referring Physician: KINGS Lorenzana    HPI:  Patient is a 79 y.o. male with medical history of hypertension who experienced bilateral knee pain about 2 weeks ago, pain changed to right lateral pain after playing golf which progressed to bilateral hip pain. The pain changed to abdominal pain which prompt to be seen at ER.     In ER, temp was 98.2 then went up to 102.4, wbc 23, U/A (-), blood cx grew MSSA. CT of   Abd/pel revealed no evidence of aortic aneurysm or dissection. No acute abnormality. Colonic  diverticulosis. Prostate enlargement.     During visit, pt is complaining of feeling tired due to lack of sleep last night. No fever, chills, sob, marquez, chest pain, abd pain, diarrhea, or dysuria.     ID team was consulted for evaluation and treatment recommendations regarding bacteremia.    Impression:   Bacteremia  - T-max 102.4, wbc 23->10.4    Blood cx (11/3) MSSA     U/A (-)    Plan:     - Continue Cefazolin    - Follow repeat blood cultures    - MRI right hip as ALMA w/o evidence of endocarditis       Anti-inflectives:   Ancef  Cefepime  vancomycin    No past medical history on file.   No family history on file.   Social History     Tobacco Use    Smoking status: Not on file    Smokeless tobacco: Not on file   Substance Use Topics    Alcohol use: Not on file       No past surgical history on file.   Prior to Admission medications    Medication Sig Start Date End Date Taking? Authorizing Provider   famotidine (PEPCID) 20 MG tablet Take 1 tablet by mouth 2 times daily 11/3/24  Yes Prince Mueller MD   acetaminophen (TYLENOL) 500 MG tablet Take 2 tablets by mouth 3 times daily as needed for Pain 11/3/24  Yes Prince Mueller MD     No Known Allergies       Subjective:     NPO for ALMA, right hip still quite painful    REVIEW OF SYSTEMS:     Total of 12 systems reviewed 
ORTHO PROGRESS NOTE    2024  Admit Date:   11/3/2024    Post Op day: * No surgery found *    Subjective:    Perfecto You continues to report right sided lower back and buttock pain with some radiation into the right leg.  Underwent MRI of pelvis yesterday showing likely SI joint septic arthritis as well as sciatic nerve neuritis.  No new complaints.  Continues to complain of pain in his right sided lower back and buttock rated as 8 or 9 out of 10.  Remains on Ancef for staph bacteremia.  No new complaints of tingling or numbness.    PT/OT:   Gait:                    Vital Signs:    Patient Vitals for the past 8 hrs:   BP Temp Temp src Pulse Resp SpO2   24 1000 -- -- -- 92 -- --   24 0832 129/66 98.6 °F (37 °C) Oral 53 16 92 %   24 0600 -- -- -- 54 -- --     Temp (24hrs), Av.1 °F (36.7 °C), Min:97.5 °F (36.4 °C), Max:98.6 °F (37 °C)      Pain Control:        Meds:    Current Facility-Administered Medications   Medication Dose Route Frequency    diphenhydrAMINE-zinc acetate 2-0.1 % cream   Topical TID PRN    traMADol (ULTRAM) tablet 50 mg  50 mg Oral Q6H PRN    diclofenac sodium (VOLTAREN) 1 % gel 4 g  4 g Topical BID    lidocaine 4 % external patch 1 patch  1 patch TransDERmal Q24H    ceFAZolin (ANCEF) 2,000 mg in sterile water 20 mL IV syringe  2,000 mg IntraVENous Q8H    QUEtiapine (SEROQUEL) tablet 25 mg  25 mg Oral Nightly    famotidine (PEPCID) tablet 20 mg  20 mg Oral BID    tamsulosin (FLOMAX) capsule 0.4 mg  0.4 mg Oral Daily    losartan (COZAAR) tablet 100 mg  100 mg Oral Daily    verapamil (CALAN SR) extended release tablet 180 mg  180 mg Oral Nightly    sodium chloride flush 0.9 % injection 5-40 mL  5-40 mL IntraVENous 2 times per day    sodium chloride flush 0.9 % injection 5-40 mL  5-40 mL IntraVENous PRN    0.9 % sodium chloride infusion   IntraVENous PRN    potassium chloride (KLOR-CON) extended release tablet 40 mEq  40 mEq Oral PRN    Or    potassium bicarb-citric 
Occupational Therapy  11/04/24    Order received, chart reviewed. Await read of B LE duplex to determine presence of DVT prior to initiating therapy evaluation. Will follow up for initial evaluation as appropriate.    Orders received, chart reviewed and patient evaluated by occupational therapy. Pending progression with skilled acute occupational therapy, recommend:    Intermittent occupational therapy up to 2-3x/week in previous living setting with additional support needed for IADLs/Spv for safety    Recommend with nursing patient to complete as able in order to maintain strength, endurance and independence: OOB to chair 3x/day, ADLs with assist and performing toileting with RW and CGA assist. Thank you for your assistance.     Full evaluation to follow.       Thank you!  Tara Nuno, OT    
Occupational Therapy  11/06/24    Chart reviewed and discussed with RN. Pt is currently POP for procedure. Will defer and follow up as able and appropriate.    Thank you!  Tara Nuno, OT    
Orthopedic Progress Note    S: Pt cannot stay on trach with his thoughts, seems confused. Hip pain reported as better, and it's really more low back, buttock as opposed to hip; able to bear weight and walk with little pain; Denies numbness, tingling, focal weakness, cp, sob, fever, chills    O: NAD, respirations unlabored; Hip rom nonpainful; skin normal, no swelling in the extremity; ttp SI joint on right side. NVI    Patient Vitals for the past 4 hrs:   Pulse   11/07/24 0600 54     Recent Labs     11/06/24  0513 11/07/24  0608   HGB 12.0* 12.2   HCT 34.8* 35.4*   * 141*   BUN 21* 23*   K 3.5 3.6    139            A/P:  MRI results pending; no concern for joint involvement of the hip,unlikely abscess.   No PT restrictions  APAP for pain  Ice to area of pain.     DARREN Aguilar  Orthopedic Trauma Service  [facility]  
PHYSICAL THERAPY    Attempted to see patient this am, but PICC line was being placed. Will follow up this pm if schedule allows.    Bettye Diaz/PT  
Patient unable to lie still for MRI so was sent back up to room. Complained of 10/10 pain that then reduced to 6/10. Notified KINGS Mora-Lidocaine patch ordered and tylenol given. Upon re-assessment patient sleeping.     Also noted non-itchy papules on patient's entire back-benadryl cream ordered.    0730- Assisted patient to restroom to have a bowel movement. Pain 2/10.   
Pharmacist Note - Vancomycin Dosing    Consult provided for this 79 y.o. male for indication of sepsis.  Antibiotic regimen(s): vanc + cefepime  Patient on vancomycin PTA? NO   Pregnancy status: N/A    Recent Labs     24  0200 24  1027   WBC 23.3*  --    CREATININE 1.28 1.05   BUN 28* 27*     Frequency of BMP: tomorrow AM  Height: 180.3 cm  Weight: 94.4 kg  Est CrCl: 65 ml/min; UO: not documented  Temp (24hrs), Av.7 °F (37.6 °C), Min:97.7 °F (36.5 °C), Max:102.4 °F (39.1 °C)    Cultures:  11/3 blood x2 - NGTD    PCT = 4.24    MRSA Swab ordered (if applicable)? YES    The plan below is expected to result in a target range of AUC/NELSON 400-600    Therapy will be initiated with a loading dose of 2250 mg IV x 1 to be followed by a maintenance dose of 750 mg IV every 12 hours.  Pharmacy to follow patient daily and order levels / make dose adjustments as appropriate.    *Vancomycin has been dosed used Bayesian kinetics software to target an AUC/NELSON of 400-600, which provides adequate exposure for an assumed infection due to MRSA with an NELSON of 1 or less while reducing the risk of nephrotoxicity as seen with traditional trough based dosing goals.    
Physical Therapy  11/4/2024    Order received, chart reviewed. Await read of B LE duplex to determine presence of DVT prior to initiating therapy evaluation. Thank you.    Erna Tony, PT, DPT, CCS  
Resumed care for  above patient.Virtual safety sitter started on  patient, wife notified as well, will continue to observe patient.  
Spiritual Care Partner Volunteer visited patient at [facility] in [facility]THO [facility] on 11/5/2024   Documented by:  Ru Welch MDIV, BCC  
has element of undiagnosed underlying dementia  Patient back to his baseline this morning, was likely due to lack of sleep, infectious process     Hip pain  Hip pain, unclear etiology, starting as knee pain from walking on the golf course now with hip pain, rather nonspecific however patient tells me he cannot walk  Radiograph unremarkable  PT/OT  Today telling me hip pain resolved  Venous duplex was done of bilateral lower extremities, no evidence of DVT     Abdominal pain  Reports did have severe abdominal pain when he came in, could be due to taking NSAIDs for the knee pain and hip pain  No further abdominal pain  Will continue famotidine   T-bili elevated, however he tells me this is chronic. Indeed I did review outside records and chronically elevated       Dehydration  creatinine within normal limits however BUN elevated  Blood pressure is lower than his reported baseline, likely element of dehydration  Will give some IV fluids and recheck     Hypertension  Blood pressure is lower than than his reported baseline, likely element of dehydration, will hold antihypertensives for now and restart as appropriate          Code status: Full  Prophylaxis: LMWH  Care Plan discussed with: Patient, nurse,   Anticipated Disposition: Pending progression     Principal Problem:    Abdominal pain  Active Problems:    Hyperbilirubinemia  Resolved Problems:    * No resolved hospital problems. *            Review of Systems:   Pertinent items are noted in HPI.         Vital Signs:    Last 24hrs VS reviewed since prior progress note. Most recent are:  /64   Pulse 83   Temp 99 °F (37.2 °C) (Oral)   Resp 18   Ht 1.803 m (5' 11\")   Wt 94.4 kg (208 lb 1.8 oz)   SpO2 96%   BMI 29.03 kg/m²      No intake or output data in the 24 hours ending 11/04/24 0817     Physical Examination:     I had a face to face encounter with this patient and independently examined them on 11/4/2024 as outlined below:          General 
or ductal dilatation. ADRENALS: Unremarkable. KIDNEYS: No mass, calculus, or hydronephrosis. Small bilateral renal cysts. STOMACH: Unremarkable. SMALL BOWEL: No dilatation or wall thickening. COLON: No dilatation or wall thickening. Colonic diverticulosis. APPENDIX: Unremarkable. PERITONEUM: No ascites or pneumoperitoneum. RETROPERITONEUM: No lymphadenopathy or aortic aneurysm. The origins of the celiac axis, SMA, single renal arteries bilaterally, and MAXIMINO are widely patent. REPRODUCTIVE ORGANS: The prostate is enlarged, measuring 6.0 cm. URINARY BLADDER: No mass or calculus. BONES: Degenerative changes are seen in the lumbar spine. ADDITIONAL COMMENTS: Small periumbilical hernia contains only fat.     No evidence of aortic aneurysm or dissection. No acute abnormality. Colonic diverticulosis. Prostate enlargement. Electronically signed by TIM PAK      Thank you for the opportunity to participate in the care of this patient. Please contact with questions or concerns.     The above plan of care that has been discussed and agreed upon by Dr. Parekh.    Signed By: Saman Parekh MD     November 5, 2024      A total time of 35 minutes was spent on today's encounter.  Greater than 50% of the time was spent on the following:  Preparing for visit and chart review.  Obtaining and/or reviewing separately obtained history  Performing a medically appropriate exam and/or evaluation  Counseling and educating a patient/family/caregiver as noted above  Referring and communicating with other professionals (not separately reported)  Independently interpreting results (not separately reported) and communicating results to the patient/family/caregiver  Care coordination (not separately reported) as noted above  Documenting clinical information in the electronic health records (e.g. problem list, visit note) on the day of the encounter

## 2024-11-07 NOTE — CARE COORDINATION
JOHN:    ** Pt's wife states that patient's cell phone (iPhone, black case) is missing in the hospital.     Anticipate home today, no therapy needs but will need IV antibiotic infusions at home.     CM sent referral to [facility] and requested education at 2pm today following conversation with wife about availability for participation. She confirms that she can help with medications at home and would like to be present during education.     Wife will transport    Transition of Care Plan:    RUR: 9%  Prior Level of Functioning: home indep  Disposition: home with IV ABX  PARISH: 11/7  If SNF or IPR: Date FOC offered:   Date FOC received:   Accepting facility:   Date authorization started with reference number:   Date authorization received and expires:   Follow up appointments: ian   DME needed: none  Transportation at discharge: wife  IM/IMM Medicare/Nina letter given: y  Is patient a  and connected with [facility]?    If yes, was  transfer form completed and [facility] notified?   Caregiver Contact: wife Alexandra You   [phone]   Discharge Caregiver contacted prior to discharge? y  Care Conference needed?   Barriers to discharge:  PICC, IV ABX edu, find cell phone

## 2024-11-09 LAB
BACTERIA SPEC CULT: ABNORMAL
BACTERIA SPEC CULT: ABNORMAL
SERVICE CMNT-IMP: ABNORMAL
SERVICE CMNT-IMP: ABNORMAL

## 2024-11-18 ENCOUNTER — HOSPITAL ENCOUNTER (INPATIENT)
Facility: HOSPITAL | Age: 79
LOS: 3 days | Discharge: HOME | DRG: 549 | End: 2024-11-21
Attending: STUDENT IN AN ORGANIZED HEALTH CARE EDUCATION/TRAINING PROGRAM | Admitting: INTERNAL MEDICINE
Payer: MEDICARE

## 2024-11-18 ENCOUNTER — TELEPHONE (OUTPATIENT)
Age: 79
End: 2024-11-18

## 2024-11-18 DIAGNOSIS — Z95.828 STATUS POST PICC CENTRAL LINE PLACEMENT: ICD-10-CM

## 2024-11-18 DIAGNOSIS — M46.58 SEPTIC ARTHRITIS OF SACROILIAC JOINT (HCC): ICD-10-CM

## 2024-11-18 DIAGNOSIS — R78.81 MSSA BACTEREMIA: Primary | ICD-10-CM

## 2024-11-18 DIAGNOSIS — B95.61 MSSA BACTEREMIA: Primary | ICD-10-CM

## 2024-11-18 PROBLEM — M46.1 SACROILIITIS (HCC): Status: ACTIVE | Noted: 2024-11-18

## 2024-11-18 PROBLEM — M00.00: Status: ACTIVE | Noted: 2024-11-07

## 2024-11-18 LAB
ANION GAP SERPL CALC-SCNC: 5 MMOL/L (ref 2–12)
BASOPHILS # BLD: 0.1 K/UL (ref 0–0.1)
BASOPHILS NFR BLD: 1 % (ref 0–1)
BUN SERPL-MCNC: 27 MG/DL (ref 6–20)
BUN/CREAT SERPL: 28 (ref 12–20)
CALCIUM SERPL-MCNC: 10 MG/DL (ref 8.5–10.1)
CHLORIDE SERPL-SCNC: 105 MMOL/L (ref 97–108)
CO2 SERPL-SCNC: 28 MMOL/L (ref 21–32)
CREAT SERPL-MCNC: 0.95 MG/DL (ref 0.7–1.3)
CRP SERPL-MCNC: 4.39 MG/DL (ref 0–0.3)
DIFFERENTIAL METHOD BLD: ABNORMAL
EOSINOPHIL # BLD: 0.3 K/UL (ref 0–0.4)
EOSINOPHIL NFR BLD: 3 % (ref 0–7)
ERYTHROCYTE [DISTWIDTH] IN BLOOD BY AUTOMATED COUNT: 12.4 % (ref 11.5–14.5)
GLUCOSE SERPL-MCNC: 95 MG/DL (ref 65–100)
HCT VFR BLD AUTO: 36.2 % (ref 36.6–50.3)
HGB BLD-MCNC: 11.7 G/DL (ref 12.1–17)
IMM GRANULOCYTES # BLD AUTO: 0 K/UL (ref 0–0.04)
IMM GRANULOCYTES NFR BLD AUTO: 0 % (ref 0–0.5)
LACTATE SERPL-SCNC: 1.4 MMOL/L (ref 0.4–2)
LYMPHOCYTES # BLD: 1.2 K/UL (ref 0.8–3.5)
LYMPHOCYTES NFR BLD: 12 % (ref 12–49)
MCH RBC QN AUTO: 32.4 PG (ref 26–34)
MCHC RBC AUTO-ENTMCNC: 32.3 G/DL (ref 30–36.5)
MCV RBC AUTO: 100.3 FL (ref 80–99)
MONOCYTES # BLD: 1.1 K/UL (ref 0–1)
MONOCYTES NFR BLD: 10 % (ref 5–13)
NEUTS SEG # BLD: 7.8 K/UL (ref 1.8–8)
NEUTS SEG NFR BLD: 74 % (ref 32–75)
NRBC # BLD: 0 K/UL (ref 0–0.01)
NRBC BLD-RTO: 0 PER 100 WBC
PLATELET # BLD AUTO: 449 K/UL (ref 150–400)
PMV BLD AUTO: 10.1 FL (ref 8.9–12.9)
POTASSIUM SERPL-SCNC: 4.7 MMOL/L (ref 3.5–5.1)
RBC # BLD AUTO: 3.61 M/UL (ref 4.1–5.7)
SODIUM SERPL-SCNC: 138 MMOL/L (ref 136–145)
WBC # BLD AUTO: 10.6 K/UL (ref 4.1–11.1)

## 2024-11-18 PROCEDURE — 99222 1ST HOSP IP/OBS MODERATE 55: CPT | Performed by: NURSE PRACTITIONER

## 2024-11-18 PROCEDURE — 80048 BASIC METABOLIC PNL TOTAL CA: CPT

## 2024-11-18 PROCEDURE — 6360000002 HC RX W HCPCS: Performed by: INTERNAL MEDICINE

## 2024-11-18 PROCEDURE — 6370000000 HC RX 637 (ALT 250 FOR IP): Performed by: INTERNAL MEDICINE

## 2024-11-18 PROCEDURE — APPNB60 APP NON BILLABLE TIME 46-60 MINS: Performed by: NURSE PRACTITIONER

## 2024-11-18 PROCEDURE — 36415 COLL VENOUS BLD VENIPUNCTURE: CPT

## 2024-11-18 PROCEDURE — 2580000003 HC RX 258: Performed by: INTERNAL MEDICINE

## 2024-11-18 PROCEDURE — 85025 COMPLETE CBC W/AUTO DIFF WBC: CPT

## 2024-11-18 PROCEDURE — 99285 EMERGENCY DEPT VISIT HI MDM: CPT

## 2024-11-18 PROCEDURE — 83605 ASSAY OF LACTIC ACID: CPT

## 2024-11-18 PROCEDURE — 6370000000 HC RX 637 (ALT 250 FOR IP): Performed by: STUDENT IN AN ORGANIZED HEALTH CARE EDUCATION/TRAINING PROGRAM

## 2024-11-18 PROCEDURE — 87040 BLOOD CULTURE FOR BACTERIA: CPT

## 2024-11-18 PROCEDURE — 1100000000 HC RM PRIVATE

## 2024-11-18 PROCEDURE — 86140 C-REACTIVE PROTEIN: CPT

## 2024-11-18 RX ORDER — SODIUM CHLORIDE 0.9 % (FLUSH) 0.9 %
5-40 SYRINGE (ML) INJECTION EVERY 12 HOURS SCHEDULED
Status: DISCONTINUED | OUTPATIENT
Start: 2024-11-18 | End: 2024-11-21 | Stop reason: HOSPADM

## 2024-11-18 RX ORDER — ENOXAPARIN SODIUM 100 MG/ML
40 INJECTION SUBCUTANEOUS DAILY
Status: CANCELLED | OUTPATIENT
Start: 2024-11-18

## 2024-11-18 RX ORDER — POTASSIUM CHLORIDE 7.45 MG/ML
10 INJECTION INTRAVENOUS PRN
Status: CANCELLED | OUTPATIENT
Start: 2024-11-18

## 2024-11-18 RX ORDER — ACETAMINOPHEN 650 MG/1
650 SUPPOSITORY RECTAL EVERY 6 HOURS PRN
Status: CANCELLED | OUTPATIENT
Start: 2024-11-18

## 2024-11-18 RX ORDER — TRAMADOL HYDROCHLORIDE 50 MG/1
50 TABLET ORAL ONCE
Status: COMPLETED | OUTPATIENT
Start: 2024-11-18 | End: 2024-11-18

## 2024-11-18 RX ORDER — VERAPAMIL HYDROCHLORIDE 180 MG/1
180 TABLET, EXTENDED RELEASE ORAL NIGHTLY
Status: DISCONTINUED | OUTPATIENT
Start: 2024-11-18 | End: 2024-11-21 | Stop reason: HOSPADM

## 2024-11-18 RX ORDER — LIDOCAINE 4 G/G
1 PATCH TOPICAL EVERY 24 HOURS
Status: DISCONTINUED | OUTPATIENT
Start: 2024-11-18 | End: 2024-11-21 | Stop reason: HOSPADM

## 2024-11-18 RX ORDER — SODIUM CHLORIDE 9 MG/ML
INJECTION, SOLUTION INTRAVENOUS PRN
Status: DISCONTINUED | OUTPATIENT
Start: 2024-11-18 | End: 2024-11-21 | Stop reason: HOSPADM

## 2024-11-18 RX ORDER — TAMSULOSIN HYDROCHLORIDE 0.4 MG/1
0.4 CAPSULE ORAL DAILY
Status: DISCONTINUED | OUTPATIENT
Start: 2024-11-18 | End: 2024-11-21 | Stop reason: HOSPADM

## 2024-11-18 RX ORDER — POLYETHYLENE GLYCOL 3350 17 G/17G
17 POWDER, FOR SOLUTION ORAL DAILY PRN
Status: DISCONTINUED | OUTPATIENT
Start: 2024-11-18 | End: 2024-11-21 | Stop reason: HOSPADM

## 2024-11-18 RX ORDER — ENOXAPARIN SODIUM 100 MG/ML
40 INJECTION SUBCUTANEOUS DAILY
Status: DISCONTINUED | OUTPATIENT
Start: 2024-11-18 | End: 2024-11-21 | Stop reason: HOSPADM

## 2024-11-18 RX ORDER — MAGNESIUM SULFATE IN WATER 40 MG/ML
2000 INJECTION, SOLUTION INTRAVENOUS PRN
Status: CANCELLED | OUTPATIENT
Start: 2024-11-18

## 2024-11-18 RX ORDER — LOSARTAN POTASSIUM 50 MG/1
100 TABLET ORAL DAILY
Status: DISCONTINUED | OUTPATIENT
Start: 2024-11-18 | End: 2024-11-21 | Stop reason: HOSPADM

## 2024-11-18 RX ORDER — POTASSIUM CHLORIDE 750 MG/1
40 TABLET, EXTENDED RELEASE ORAL PRN
Status: CANCELLED | OUTPATIENT
Start: 2024-11-18

## 2024-11-18 RX ORDER — FAMOTIDINE 20 MG/1
20 TABLET, FILM COATED ORAL 2 TIMES DAILY
Status: DISCONTINUED | OUTPATIENT
Start: 2024-11-18 | End: 2024-11-21 | Stop reason: HOSPADM

## 2024-11-18 RX ORDER — ONDANSETRON 4 MG/1
4 TABLET, ORALLY DISINTEGRATING ORAL EVERY 8 HOURS PRN
Status: CANCELLED | OUTPATIENT
Start: 2024-11-18

## 2024-11-18 RX ORDER — ACETAMINOPHEN 325 MG/1
650 TABLET ORAL EVERY 6 HOURS PRN
Status: DISCONTINUED | OUTPATIENT
Start: 2024-11-18 | End: 2024-11-21 | Stop reason: HOSPADM

## 2024-11-18 RX ORDER — ONDANSETRON 2 MG/ML
4 INJECTION INTRAMUSCULAR; INTRAVENOUS EVERY 6 HOURS PRN
Status: DISCONTINUED | OUTPATIENT
Start: 2024-11-18 | End: 2024-11-21 | Stop reason: HOSPADM

## 2024-11-18 RX ORDER — SODIUM CHLORIDE 0.9 % (FLUSH) 0.9 %
5-40 SYRINGE (ML) INJECTION PRN
Status: DISCONTINUED | OUTPATIENT
Start: 2024-11-18 | End: 2024-11-21 | Stop reason: HOSPADM

## 2024-11-18 RX ADMIN — TAMSULOSIN HYDROCHLORIDE 0.4 MG: 0.4 CAPSULE ORAL at 18:30

## 2024-11-18 RX ADMIN — LOSARTAN POTASSIUM 100 MG: 50 TABLET, FILM COATED ORAL at 18:30

## 2024-11-18 RX ADMIN — ACETAMINOPHEN 650 MG: 325 TABLET ORAL at 20:21

## 2024-11-18 RX ADMIN — WATER 2000 MG: 1 INJECTION INTRAMUSCULAR; INTRAVENOUS; SUBCUTANEOUS at 16:35

## 2024-11-18 RX ADMIN — ENOXAPARIN SODIUM 40 MG: 100 INJECTION SUBCUTANEOUS at 20:24

## 2024-11-18 RX ADMIN — TRAMADOL HYDROCHLORIDE 50 MG: 50 TABLET ORAL at 13:33

## 2024-11-18 RX ADMIN — VERAPAMIL HYDROCHLORIDE 180 MG: 180 TABLET, FILM COATED, EXTENDED RELEASE ORAL at 20:20

## 2024-11-18 RX ADMIN — SODIUM CHLORIDE, PRESERVATIVE FREE 10 ML: 5 INJECTION INTRAVENOUS at 20:21

## 2024-11-18 RX ADMIN — FAMOTIDINE 20 MG: 20 TABLET, FILM COATED ORAL at 20:20

## 2024-11-18 ASSESSMENT — PAIN DESCRIPTION - PAIN TYPE
TYPE: CHRONIC PAIN
TYPE: CHRONIC PAIN

## 2024-11-18 ASSESSMENT — PAIN DESCRIPTION - ONSET
ONSET: ON-GOING
ONSET: ON-GOING

## 2024-11-18 ASSESSMENT — PAIN SCALES - GENERAL
PAINLEVEL_OUTOF10: 9
PAINLEVEL_OUTOF10: 7
PAINLEVEL_OUTOF10: 7

## 2024-11-18 ASSESSMENT — PAIN DESCRIPTION - LOCATION
LOCATION: HIP

## 2024-11-18 ASSESSMENT — LIFESTYLE VARIABLES
HOW MANY STANDARD DRINKS CONTAINING ALCOHOL DO YOU HAVE ON A TYPICAL DAY: PATIENT DOES NOT DRINK
HOW OFTEN DO YOU HAVE A DRINK CONTAINING ALCOHOL: MONTHLY OR LESS

## 2024-11-18 ASSESSMENT — PAIN DESCRIPTION - FREQUENCY
FREQUENCY: CONTINUOUS
FREQUENCY: CONTINUOUS

## 2024-11-18 ASSESSMENT — PAIN DESCRIPTION - DESCRIPTORS
DESCRIPTORS: ACHING
DESCRIPTORS: ACHING

## 2024-11-18 ASSESSMENT — PAIN DESCRIPTION - ORIENTATION
ORIENTATION: RIGHT

## 2024-11-18 ASSESSMENT — PAIN - FUNCTIONAL ASSESSMENT
PAIN_FUNCTIONAL_ASSESSMENT: ACTIVITIES ARE NOT PREVENTED
PAIN_FUNCTIONAL_ASSESSMENT: 0-10
PAIN_FUNCTIONAL_ASSESSMENT: PREVENTS OR INTERFERES SOME ACTIVE ACTIVITIES AND ADLS

## 2024-11-18 NOTE — CONSULTS
Infectious Disease Consult    INFECTIOUS DISEASE Attending:     I agree with the above infectious disease daily progress note in its entirety as authored by and discussed in detail with the nurse practitioner.   I have reviewed pertinent laboratory studies, microbiology cultures, radiologic reports with review of the consultations and progress notes as appropriate.   I agree with today's subjective findings, physical examination, assessment and plan of care as described above and discussed extensively with the nurse practitioner.       Remove PICC line.    Peripheral IV insertion.    Repeat blood cultures x2 sets - if negative x48 hours, then should     Continue Cefazolin 2g IV q8hrs.    MRI pelvis for surveillance.    A total time of 35 minutes was spent on today's encounter.  Greater than 50% of the time was spent on the following:  Preparing for visit and chart review.  Obtaining and/or reviewing separately obtained history  Performing a medically appropriate exam and/or evaluation  Counseling and educating a patient/family/caregiver as noted above  Placing relevant orders  Referring and communicating with other professionals (not separately reported)  Independently interpreting results (not separately reported) and communicating results to the patient/family/caregiver  Care coordination (not separately reported) as noted above  Documenting clinical information in the electronic health records (e.g. problem list, visit note) on the day of the encounter      Today's Date: 11/18/2024   Admit Date: 11/18/2024    Date of Consultation:  November 18, 2024  Reason for consult: bacteremia and malfunctioning PICC line  Referring Physician: MD Trey    HPI:    Patient is a 79 y.o. male with medical history of hypertension who experienced bilateral knee pain about 2 weeks ago, pain changed to right lateral pain after playing golf which progressed to bilateral hip pain. Pt was hospitalized between 11/3 to 11/7 with MSSA  Negative = text not underlined  General:  fever, chills, sweats, generalized weakness, weight loss/gain,      loss of appetite   Eyes:    blurred vision, eye pain, loss of vision, double vision  ENT:    rhinorrhea, pharyngitis   Respiratory:   cough, sputum production, SOB, wheezing, HERZOG, pleuritic pain   Cardiology:   chest pain, palpitations, orthopnea, PND, edema, syncope   Gastrointestinal:  abdominal pain , N/V, dysphagia, diarrhea, constipation, bleeding   Genitourinary:  frequency, urgency, dysuria, hematuria, incontinence   Muskuloskeletal :  arthralgia, myalgia   Hematology:  easy bruising, nose or gum bleeding, lymphadenopathy   Dermatological: rash, ulceration, pruritis   Endocrine:   hot flashes or polydipsia   Neurological:  headache, dizziness, confusion, focal weakness, paresthesia,     Speech difficulties, memory loss, gait disturbance  Psychological: Feelings of anxiety, depression, agitation    Objective:     Visit Vitals  BP (!) 150/73   Pulse 83   Temp 97.9 °F (36.6 °C) (Oral)   Resp 20   Ht 1.829 m (6')   Wt 93.7 kg (206 lb 9.1 oz)   SpO2 92%   BMI 28.02 kg/m²     Temp (24hrs), Av.9 °F (36.6 °C), Min:97.9 °F (36.6 °C), Max:97.9 °F (36.6 °C)       Lines:  peripheral    PHYSICAL EXAM:   General:    Alert, cooperative, no distress, appears stated age.     HEENT: Atraumatic, anicteric sclerae, pink conjunctivae     No oral ulcers, mucosa moist, throat clear  Neck:  Supple, symmetrical,  thyroid: non tender  Lungs:   Clear to auscultation bilaterally.  No Wheezing or Rhonchi. No rales.  Chest wall:  No tenderness  No Accessory muscle use.  Heart:   Regular  rhythm,  No  murmur   No edema  Abdomen:   Soft, non-tender. Not distended.  Bowel sounds normal  Extremities: No cyanosis.  No clubbing  Skin:     Not pale.  Not Jaundiced  No rashes   Psych:  Good insight.  Not depressed.  Not anxious or agitated.  Neurologic: EOMs intact. No facial asymmetry. No aphasia or slurred speech. Alert and oriented

## 2024-11-18 NOTE — PROCEDURES
PROCEDURE NOTE  Date: 11/18/2024   Name: Perfecto You  YOB: 1945    Order to replace dislodged PICC. Chart review revealed positive blood cultures 11/09  from blood cultures done 11/5. Awaiting ID's approval to reinsert PICC.PICC reinsertion on hold for now per ID.PICC line out 5cm/line removed at 1340. Tip intact at 41 cm. Dressing  applied,bedrest x 30min.

## 2024-11-18 NOTE — ED PROVIDER NOTES
[facility] EMERGENCY DEP  EMERGENCY DEPARTMENT ENCOUNTER      Pt Name: Perfecto You  MRN: [id number]  Birthdate 1945  Date of evaluation: 11/18/2024  Provider: Jovani Yang DO    CHIEF COMPLAINT       Chief Complaint   Patient presents with    Referral - General         HISTORY OF PRESENT ILLNESS   (Location/Symptom, Timing/Onset, Context/Setting, Quality, Duration, Modifying Factors, Severity)  Note limiting factors.   79-year-old male history of hypertension presenting today secondary to PICC line complication.  Patient recently admitted for MSSA bacteremia thought to be related to an infected hip joint.  He was treated for sepsis in the hospital and discharged with a right upper extremity PICC line and Ancef 3 times daily.  Patient reports that over the past few days infusions have been a bit difficult and then when his PICC line nurse came to see him today he felt like the line was withdrawn and could not be used.  He did not get his morning infusion today.  He does not have any pain or redness at the site of insertion.  He did have a little bit of bleeding earlier.  No fevers.  Does continue to have 7 out of 10 pain in the right hip but this is unchanged.            Review of External Medical Records:     Nursing Notes were reviewed.    REVIEW OF SYSTEMS    (2-9 systems for level 4, 10 or more for level 5)     Review of Systems   Musculoskeletal:  Positive for arthralgias.       Except as noted above the remainder of the review of systems was reviewed and negative.       PAST MEDICAL HISTORY     Past Medical History:   Diagnosis Date    Hypertension          SURGICAL HISTORY       Past Surgical History:   Procedure Laterality Date    CHOLECYSTECTOMY           CURRENT MEDICATIONS       Previous Medications    ACETAMINOPHEN (TYLENOL) 500 MG TABLET    Take 2 tablets by mouth 3 times daily as needed for Pain    BACILLUS COAGULANS-INULIN (PROBIOTIC) 1-250 BILLION-MG CAPS    Take 1 tablet by mouth daily

## 2024-11-18 NOTE — ED TRIAGE NOTES
Patient is receiving IV antibiotics and the PICC line to the right upper arm has become dislodged. Patient was sent in by [facility] for the PICC team to come and evaluated if new PICC line is needed.

## 2024-11-18 NOTE — ED NOTES
ED TO INPATIENT SBAR HANDOFF    Patient Name: Perfecto You   :  1945  79 y.o.   MRN:  [id number]  ED Room #:  ER05/05  Family/Caregiver Present no       Situation  Code Status: Prior     Allergies: Patient has no known allergies.  Weight: Patient Vitals for the past 96 hrs (Last 3 readings):   Weight   24 1215 93.7 kg (206 lb 9.1 oz)     Arrived from: home  Chief Complaint:   Chief Complaint   Patient presents with    Referral - General     Hospital Problem/Diagnosis:  Principal Problem:    MSSA bacteremia  Active Problems:    Staphylococcal arthritis    Sacroiliitis (HCC)  Resolved Problems:    * No resolved hospital problems. *    Imaging:   MRI PELVIS W WO CONTRAST    (Results Pending)     Abnormal labs:   Abnormal Labs Reviewed   CBC WITH AUTO DIFFERENTIAL - Abnormal; Notable for the following components:       Result Value    RBC 3.61 (*)     Hemoglobin 11.7 (*)     Hematocrit 36.2 (*)     .3 (*)     Platelets 449 (*)     Monocytes Absolute 1.1 (*)     All other components within normal limits   BASIC METABOLIC PANEL - Abnormal; Notable for the following components:    BUN 27 (*)     BUN/Creatinine Ratio 28 (*)     All other components within normal limits   C-REACTIVE PROTEIN - Abnormal; Notable for the following components:    CRP 4.39 (*)     All other components within normal limits     Abnormal Assessment Findings: bacteriemia    SAFETY    Mobility: no current mobility problem   ED Fall Risk: Presents to emergency department  because of falls (Syncope, seizure, or loss of consciousness): No, Age > 70: Yes, Altered Mental Status, Intoxication with alcohol or substance confusion (Disorientation, impaired judgment, poor safety awaremess, or inability to follow instructions): No, Impaired Mobility: Ambulates or transfers with assistive devices or assistance; Unable to ambulate or transer.: Yes, Nursing Judgement: Yes   Restraints no   Sitter no     Background  History:   Past Medical  History:   Diagnosis Date    Hypertension        Assessment    Vitals/MEWS: MEWS Score: 1  Level of Consciousness: Alert (0)   Vitals:    11/18/24 1215 11/18/24 1635   BP: (!) 150/73 (!) 145/83   Pulse: 83 67   Resp: 20    Temp: 97.9 °F (36.6 °C) 97.8 °F (36.6 °C)   TempSrc: Oral Oral   SpO2: 92%    Weight: 93.7 kg (206 lb 9.1 oz)    Height: 1.829 m (6')      DI:   Predictive Model Details          29 (Normal)  Factor Value    Calculated 11/18/2024 16:43 44% Age 79 years old    Deterioration Index Model 19% Respiratory rate 20     11% Potassium 4.7 mmol/L     7% Systolic 145     6% WBC count 10.6 K/uL     6% Pulse oximetry 92 %     3% BUN abnormal (27 MG/DL)     2% Sodium 138 mmol/L     2% Platelet count abnormal (449 K/uL)     0% Hematocrit abnormal (36.2 %)     0% Pulse 67     0% Temperature 97.8 °F (36.6 °C)       FiO2 (%):   O2 Flow Rate: O2 Device: None (Room air)    Cardiac Rhythm:    Pain Scale: Pain Assessment  Pain Assessment: 0-10  Pain Level: 9  Patient's Stated Pain Goal: 0 - No pain  Pain Location: Hip  Pain Orientation: Right  Pain Descriptors: Aching  Functional Pain Assessment: Activities are not prevented  Pain Type: Chronic pain  Pain Frequency: Continuous  Pain Onset: On-going  Non-Pharmaceutical Pain Intervention(s): Rest  Last documented pain score (0-10 scale) Pain Level: 9  Last documented pain medication administered: 1330     Mental Status: oriented, alert, and coherent  Orientation Level:    NIH Score:    C-SSRS: Risk of Suicide: No Risk  Bedside swallow:    Henning Coma Scale (GCS): Adarsh Coma Scale  Eye Opening: Spontaneous  Best Verbal Response: Oriented  Best Motor Response: Obeys commands  Henning Coma Scale Score: 15  Active LDA's:   Peripheral IV 11/18/24 Right Antecubital (Active)   Site Assessment Clean, dry & intact 11/18/24 1410   Line Status Brisk blood return;Specimen collected;Normal saline locked;Flushed 11/18/24 1410   Line Care Connections checked and tightened 11/18/24

## 2024-11-18 NOTE — TELEPHONE ENCOUNTER
Nurse called & stated the Pt's Picc Line came out a little bit & urgently needs a respone about next steps

## 2024-11-18 NOTE — ED NOTES
Pt talking to infectious disease doctor- (Dr Dumas)  PICC team is here.   Dr Dumas will be admitting pt.   PICC team here to remove PICC line

## 2024-11-18 NOTE — H&P
History and Physical    Date of Service:  11/18/2024  Primary Care Provider: Oscar Toledo MD  Source of information: The patient and Chart review    Chief Complaint: Referral - General      History of Presenting Illness:   Perfecto You is a 79 y.o. male with HTN and recently diagnosed R sacroiliac septic arthritis and MSSA bacteremia (on cefazolin x 6weeks until 12/16/2024 via [facility] PICC) who was sent by  for dislodged PICC in [facility]. BC 11/3 grew MSSA in 4 of 4 bottles and 11/5 in 2 of 4 bottles (finalized on 11/9). ID was consulted. PICC was not replaced. Pt c/o R hip pain, constipation (last BM was this morning), and itchy rash on his left flank. He denies f/c/n/v, CP, SOB, cough, abdominal pain, recent falls, injuries, LOC, numbness, tingling, weakness.      REVIEW OF SYSTEMS:  Pertinent items are noted in the History of Present Illness.     Past Medical History:   Diagnosis Date    Hypertension       Past Surgical History:   Procedure Laterality Date    CHOLECYSTECTOMY       Prior to Admission medications    Medication Sig Start Date End Date Taking? Authorizing Provider   losartan (COZAAR) 100 MG tablet Take 1 tablet by mouth daily 1/22/24   Lauryn Alarcon MD   tamsulosin (FLOMAX) 0.4 MG capsule Take 1 tablet by mouth daily 10/21/24   Lauryn Alarcon MD   verapamil (CALAN SR) 180 MG extended release tablet Take 1 tablet by mouth nightly 1/4/24   Lauryn Alarcon MD   diclofenac sodium (VOLTAREN) 1 % GEL Apply 4 g topically 2 times daily 11/7/24   Melissa Solares APRN - NP   lidocaine 4 % external patch Place 1 patch onto the skin every 24 hours 11/7/24   Melissa Solares APRN - NP   Bacillus Coagulans-Inulin (PROBIOTIC) 1-250 BILLION-MG CAPS Take 1 tablet by mouth daily 11/7/24 1/6/25  Melissa Solares APRN - NP   famotidine (PEPCID) 20 MG tablet Take 1 tablet by mouth 2 times daily 11/3/24   Prince Mueller MD   acetaminophen (TYLENOL) 500 MG tablet Take 2 tablets by mouth 3

## 2024-11-18 NOTE — ED NOTES
Pt needs to lay down for 30 minutes after PICC removal.   Pt moved to Room 5; report given to Tiffanie MORALES RN

## 2024-11-19 ENCOUNTER — APPOINTMENT (OUTPATIENT)
Facility: HOSPITAL | Age: 79
DRG: 549 | End: 2024-11-19
Payer: MEDICARE

## 2024-11-19 LAB
ANION GAP SERPL CALC-SCNC: 7 MMOL/L (ref 2–12)
BASOPHILS # BLD: 0.1 K/UL (ref 0–0.1)
BASOPHILS NFR BLD: 1 % (ref 0–1)
BUN SERPL-MCNC: 25 MG/DL (ref 6–20)
BUN/CREAT SERPL: 30 (ref 12–20)
CALCIUM SERPL-MCNC: 9.4 MG/DL (ref 8.5–10.1)
CHLORIDE SERPL-SCNC: 107 MMOL/L (ref 97–108)
CO2 SERPL-SCNC: 27 MMOL/L (ref 21–32)
CREAT SERPL-MCNC: 0.83 MG/DL (ref 0.7–1.3)
DIFFERENTIAL METHOD BLD: ABNORMAL
EOSINOPHIL # BLD: 0.3 K/UL (ref 0–0.4)
EOSINOPHIL NFR BLD: 5 % (ref 0–7)
ERYTHROCYTE [DISTWIDTH] IN BLOOD BY AUTOMATED COUNT: 12.6 % (ref 11.5–14.5)
GLUCOSE SERPL-MCNC: 95 MG/DL (ref 65–100)
HCT VFR BLD AUTO: 32 % (ref 36.6–50.3)
HGB BLD-MCNC: 10.6 G/DL (ref 12.1–17)
IMM GRANULOCYTES # BLD AUTO: 0 K/UL (ref 0–0.04)
IMM GRANULOCYTES NFR BLD AUTO: 0 % (ref 0–0.5)
LYMPHOCYTES # BLD: 1.3 K/UL (ref 0.8–3.5)
LYMPHOCYTES NFR BLD: 19 % (ref 12–49)
MCH RBC QN AUTO: 32.8 PG (ref 26–34)
MCHC RBC AUTO-ENTMCNC: 33.1 G/DL (ref 30–36.5)
MCV RBC AUTO: 99.1 FL (ref 80–99)
MONOCYTES # BLD: 0.9 K/UL (ref 0–1)
MONOCYTES NFR BLD: 13 % (ref 5–13)
NEUTS SEG # BLD: 4 K/UL (ref 1.8–8)
NEUTS SEG NFR BLD: 62 % (ref 32–75)
NRBC # BLD: 0 K/UL (ref 0–0.01)
NRBC BLD-RTO: 0 PER 100 WBC
PLATELET # BLD AUTO: 427 K/UL (ref 150–400)
PMV BLD AUTO: 10 FL (ref 8.9–12.9)
POTASSIUM SERPL-SCNC: 4.7 MMOL/L (ref 3.5–5.1)
RBC # BLD AUTO: 3.23 M/UL (ref 4.1–5.7)
SODIUM SERPL-SCNC: 141 MMOL/L (ref 136–145)
WBC # BLD AUTO: 6.6 K/UL (ref 4.1–11.1)

## 2024-11-19 PROCEDURE — 2580000003 HC RX 258: Performed by: INTERNAL MEDICINE

## 2024-11-19 PROCEDURE — 6360000002 HC RX W HCPCS: Performed by: INTERNAL MEDICINE

## 2024-11-19 PROCEDURE — 6360000004 HC RX CONTRAST MEDICATION: Performed by: NURSE PRACTITIONER

## 2024-11-19 PROCEDURE — 97535 SELF CARE MNGMENT TRAINING: CPT

## 2024-11-19 PROCEDURE — 99231 SBSQ HOSP IP/OBS SF/LOW 25: CPT | Performed by: INTERNAL MEDICINE

## 2024-11-19 PROCEDURE — 72197 MRI PELVIS W/O & W/DYE: CPT

## 2024-11-19 PROCEDURE — 85025 COMPLETE CBC W/AUTO DIFF WBC: CPT

## 2024-11-19 PROCEDURE — A9579 GAD-BASE MR CONTRAST NOS,1ML: HCPCS | Performed by: NURSE PRACTITIONER

## 2024-11-19 PROCEDURE — 6370000000 HC RX 637 (ALT 250 FOR IP): Performed by: NURSE PRACTITIONER

## 2024-11-19 PROCEDURE — 80048 BASIC METABOLIC PNL TOTAL CA: CPT

## 2024-11-19 PROCEDURE — 6370000000 HC RX 637 (ALT 250 FOR IP): Performed by: INTERNAL MEDICINE

## 2024-11-19 PROCEDURE — 97165 OT EVAL LOW COMPLEX 30 MIN: CPT

## 2024-11-19 PROCEDURE — 1100000000 HC RM PRIVATE

## 2024-11-19 RX ORDER — TRAMADOL HYDROCHLORIDE 50 MG/1
50 TABLET ORAL ONCE
Status: COMPLETED | OUTPATIENT
Start: 2024-11-19 | End: 2024-11-19

## 2024-11-19 RX ORDER — TRAMADOL HYDROCHLORIDE 50 MG/1
50 TABLET ORAL EVERY 6 HOURS PRN
Status: DISCONTINUED | OUTPATIENT
Start: 2024-11-19 | End: 2024-11-21 | Stop reason: HOSPADM

## 2024-11-19 RX ADMIN — TRAMADOL HYDROCHLORIDE 50 MG: 50 TABLET ORAL at 06:59

## 2024-11-19 RX ADMIN — TRAMADOL HYDROCHLORIDE 50 MG: 50 TABLET ORAL at 10:55

## 2024-11-19 RX ADMIN — VERAPAMIL HYDROCHLORIDE 180 MG: 180 TABLET, FILM COATED, EXTENDED RELEASE ORAL at 20:23

## 2024-11-19 RX ADMIN — SODIUM CHLORIDE, PRESERVATIVE FREE 10 ML: 5 INJECTION INTRAVENOUS at 20:24

## 2024-11-19 RX ADMIN — WATER 2000 MG: 1 INJECTION INTRAMUSCULAR; INTRAVENOUS; SUBCUTANEOUS at 02:51

## 2024-11-19 RX ADMIN — FAMOTIDINE 20 MG: 20 TABLET, FILM COATED ORAL at 09:22

## 2024-11-19 RX ADMIN — FAMOTIDINE 20 MG: 20 TABLET, FILM COATED ORAL at 20:23

## 2024-11-19 RX ADMIN — TAMSULOSIN HYDROCHLORIDE 0.4 MG: 0.4 CAPSULE ORAL at 09:28

## 2024-11-19 RX ADMIN — ENOXAPARIN SODIUM 40 MG: 100 INJECTION SUBCUTANEOUS at 09:22

## 2024-11-19 RX ADMIN — GADOTERIDOL 20 ML: 279.3 INJECTION, SOLUTION INTRAVENOUS at 20:02

## 2024-11-19 RX ADMIN — SODIUM CHLORIDE, PRESERVATIVE FREE 10 ML: 5 INJECTION INTRAVENOUS at 09:22

## 2024-11-19 RX ADMIN — WATER 2000 MG: 1 INJECTION INTRAMUSCULAR; INTRAVENOUS; SUBCUTANEOUS at 09:22

## 2024-11-19 RX ADMIN — WATER 2000 MG: 1 INJECTION INTRAMUSCULAR; INTRAVENOUS; SUBCUTANEOUS at 17:08

## 2024-11-19 RX ADMIN — TRAMADOL HYDROCHLORIDE 50 MG: 50 TABLET ORAL at 23:17

## 2024-11-19 RX ADMIN — TRAMADOL HYDROCHLORIDE 50 MG: 50 TABLET ORAL at 17:08

## 2024-11-19 RX ADMIN — LOSARTAN POTASSIUM 100 MG: 50 TABLET, FILM COATED ORAL at 09:22

## 2024-11-19 ASSESSMENT — PAIN SCALES - GENERAL
PAINLEVEL_OUTOF10: 9
PAINLEVEL_OUTOF10: 3
PAINLEVEL_OUTOF10: 9
PAINLEVEL_OUTOF10: 1
PAINLEVEL_OUTOF10: 5
PAINLEVEL_OUTOF10: 2
PAINLEVEL_OUTOF10: 6

## 2024-11-19 ASSESSMENT — PAIN DESCRIPTION - LOCATION
LOCATION: BACK;HIP
LOCATION: HIP
LOCATION: HIP
LOCATION: HIP;BACK
LOCATION: HIP

## 2024-11-19 ASSESSMENT — PAIN - FUNCTIONAL ASSESSMENT
PAIN_FUNCTIONAL_ASSESSMENT: ACTIVITIES ARE NOT PREVENTED

## 2024-11-19 ASSESSMENT — PAIN DESCRIPTION - ORIENTATION
ORIENTATION: RIGHT

## 2024-11-19 ASSESSMENT — PAIN DESCRIPTION - DESCRIPTORS
DESCRIPTORS: CRAMPING
DESCRIPTORS: ACHING
DESCRIPTORS: CRAMPING;SHARP
DESCRIPTORS: ACHING
DESCRIPTORS: ACHING

## 2024-11-19 NOTE — PROGRESS NOTES
pertinent labs, diagnostic studies, imaging, and have provided independent interpretation of the same.     Labs:     Recent Labs     11/18/24  1407 11/19/24  0614   WBC 10.6 6.6   HGB 11.7* 10.6*   HCT 36.2* 32.0*   * 427*     Recent Labs     11/18/24  1407 11/19/24  0614    141   K 4.7 4.7    107   CO2 28 27   BUN 27* 25*     No results for input(s): \"ALT\", \"TP\", \"GLOB\", \"GGT\" in the last 72 hours.    Invalid input(s): \"SGOT\", \"GPT\", \"AP\", \"TBIL\", \"TBILI\", \"ALB\", \"AML\", \"AMYP\", \"LPSE\", \"HLPSE\"  No results for input(s): \"INR\", \"APTT\" in the last 72 hours.    Invalid input(s): \"PTP\"   No results for input(s): \"TIBC\" in the last 72 hours.    Invalid input(s): \"FE\", \"PSAT\", \"FERR\"   No results found for: \"RBCF\"   No results for input(s): \"PH\", \"PCO2\", \"PO2\" in the last 72 hours.  No results for input(s): \"CPK\" in the last 72 hours.    Invalid input(s): \"CPKMB\", \"CKNDX\", \"TROIQ\"  No results found for: \"CHOL\", \"CHLST\", \"CHOLV\", \"HDL\", \"HDLC\", \"LDL\", \"LDLC\"  No results found for: \"GLUCPOC\"  [unfilled]    Notes reviewed from all clinical/nonclinical/nursing services involved in patient's clinical care. Care coordination discussions were held with appropriate clinical/nonclinical/ nursing providers based on care coordination needs.         Patients current active Medications were reviewed, considered, added and adjusted based on the clinical condition today.      Home Medications were reconciled to the best of my ability given all available resources at the time of admission. Route is PO if not otherwise noted.      Admission Status:[id number]:::1}      Medications Reviewed:     Current Facility-Administered Medications   Medication Dose Route Frequency    traMADol (ULTRAM) tablet 50 mg  50 mg Oral Q6H PRN    ceFAZolin (ANCEF) 2,000 mg in sterile water 20 mL IV syringe  2,000 mg IntraVENous Q8H    sodium chloride flush 0.9 % injection 5-40 mL  5-40 mL IntraVENous 2 times per day    sodium chloride flush 0.9 %  injection 5-40 mL  5-40 mL IntraVENous PRN    0.9 % sodium chloride infusion   IntraVENous PRN    ondansetron (ZOFRAN) injection 4 mg  4 mg IntraVENous Q6H PRN    polyethylene glycol (GLYCOLAX) packet 17 g  17 g Oral Daily PRN    acetaminophen (TYLENOL) tablet 650 mg  650 mg Oral Q6H PRN    famotidine (PEPCID) tablet 20 mg  20 mg Oral BID    lidocaine 4 % external patch 1 patch  1 patch TransDERmal Q24H    losartan (COZAAR) tablet 100 mg  100 mg Oral Daily    tamsulosin (FLOMAX) capsule 0.4 mg  0.4 mg Oral Daily    verapamil (CALAN SR) extended release tablet 180 mg  180 mg Oral Nightly    enoxaparin (LOVENOX) injection 40 mg  40 mg SubCUTAneous Daily    diphenhydrAMINE-zinc acetate (BENADRYL) cream   Topical BID PRN     ______________________________________________________________________  EXPECTED LENGTH OF STAY: 6  ACTUAL LENGTH OF STAY:          1                 Sushma Madala, MD

## 2024-11-19 NOTE — PROGRESS NOTES
OCCUPATIONAL THERAPY EVALUATION/DISCHARGE  Patient: Perfecto You (79 y.o. male)  Date: 11/19/2024  Primary Diagnosis: Status post PICC central line placement [Z95.828]  MSSA bacteremia [R78.81, B95.61]         Precautions:                    ASSESSMENT :  Based on the objective data below, the patient presents on day 1 of admission for dislodged PICC line. He is Mod I for mobility with SPC and has been up in room and is organizing room items on arrival. Mod I - Independent for ADLs, good safety insight and awareness demonstrated and functional activity tolerance WFL. No strength or ROM deficits and reports only mild pain in R hip which is worse at rest and improves with frequent mobility which he has been completing daily at home; encouraged the same while inpatient and informed [facility] staff. No further acute OT needs identified at this time; please re-consult if change in status or further needs arise. Recommending home with family assistance when medically ready.    Functional Outcome Measure:  The patient scored 24/24 on the [facility] outcome measure which is indicative of decreased likelihood for need for SNF/IPR at discharge.      Further skilled acute occupational therapy is not indicated at this time.     PLAN :  Recommend with staff: Up ad owen when unhooked from IV pole, ambulate in hallways 3x daily, OOB for all meals    Recommendation for discharge: (in order for the patient to meet his/her long term goals):   No skilled occupational therapy    Other factors to consider for discharge: no additional factors    IF patient discharges home will need the following DME: patient owns DME required for discharge     SUBJECTIVE:   Patient stated, “I play 18 holes of golf right before I came back in here.”    OBJECTIVE DATA SUMMARY:     Past Medical History:   Diagnosis Date    Hypertension      Past Surgical History:   Procedure Laterality Date    CHOLECYSTECTOMY         Prior Level of Function/Environment/Context:   ,  ,   ,  ,  ,  ,  ,  ,  ,  ,       Expanded or extensive additional review of patient history:   Social/Functional History  Lives With: Spouse  Type of Home: House  Home Layout: Two level, Bed/Bath upstairs  Home Access: Stairs to enter with rails  Entrance Stairs - Number of Steps: 9  Entrance Stairs - Rails: Both  Bathroom Shower/Tub: Walk-in shower  Home Equipment: Cane, Walker - Standard    Hand Dominance: right     EXAMINATION OF PERFORMANCE DEFICITS:    Cognitive/Behavioral Status:    Orientation  Overall Orientation Status: Within Normal Limits  Orientation Level: Oriented X4  Cognition  Overall Cognitive Status: WNL    Skin: WNL    Edema: None    Hearing:   Hearing  Hearing: Within functional limits    Vision/Perceptual:          Vision  Vision: Within Functional Limits  Perception  Overall Perceptual Status: WFL    Range of Motion:   AROM: Generally decreased, functional  PROM: Within functional limits      Strength:  Strength: Within functional limits      Coordination:  Coordination: Within functional limits            Tone & Sensation:   Tone: Normal  Sensation: Intact      Functional Mobility and Transfers for ADLs:  Bed Mobility:     Bed Mobility Training  Bed Mobility Training: Yes  Supine to Sit: Supervision  Scooting: Supervision    Transfers:     Transfer Training  Transfer Training: Yes  Overall Level of Assistance: Modified independent  Sit to Stand: Modified independent  Stand to Sit: Modified independent                                   Balance:      Balance  Sitting: Intact  Standing: Intact;With support      ADL Assessment:          Feeding: Independent       Grooming: Independent       UE Bathing: Modified independent        Skin Care: Chlorhexidine wipes    LE Bathing: Modified independent        UE Dressing: Independent       LE Dressing: Modified independent        Toileting: Modified independent               Functional Mobility: Modified independent         Skin Care: Chlorhexidine

## 2024-11-19 NOTE — PROGRESS NOTES
Physical Therapy  Orders received and chart reviewed.  Arrived and being assessed by OT.  Patient demonstrating independent bed mobility, transfers and gait with SPC.  Steady gait and balance.  No PT needs at this time.  Encouraged to be up and walking on the unit as able to maintain mobility.  Will complete orders.  LINDSAY BRAGG, PT

## 2024-11-19 NOTE — CARE COORDINATION
Care Management Initial Assessment       RUR: 12%  Readmission? Yes - 11-3 to 11-7-24  1st IM letter given? Yes - 11-18-24  Emergency Contacts: wife Alexandra You [phone]  Previous rehab/services: [facility] (current for nursing), [facility] (current with IV ancef and supplies)    11-3 to 11-7-24 Inpatient at [facility]. Patient with septic arthritis right sacroililiac joint. Patient discharged with IV ancef x 6 weeks with [facility] providing medication and supplies and  nurse with [facility].     11/18/24 Inpatient at [facility] for SUJATHA bacteremia. PICC line malfunctioned.    Patient was walking in [place] with his cane. CM walked with patient while completing assessment. Patient said he went to see a physician [facility] for a second opinion. After discussion of other options, it was felt that patient receiving the IV abx was still the best option for patient. Patient would like to continue to use the same agencies ([facility] and [facility]) at discharge.     Residence: Patient lives with his wife in a 2 level home and 9 steps to enter home. Patient is independent with his ADLs.   ADL: independent, walks with cane  DME: uses cane, owns multiple other pieces of equipment that are in attic  PCP: Dr. Oscar Toledo  Verified Insurance: Medicare A and B, BCBS supplement  Transportation: wife    Updated unit .       11/19/24 1012   Service Assessment   Patient Orientation Alert and Oriented   Cognition Alert   History Provided By Patient   Primary Caregiver Self   Support Systems Spouse/Significant Other;Children   Patient's Healthcare Decision Maker is: Legal Next of Kin  (wife)   PCP Verified by CM Yes  (Dr. Oscar Toledo)   Last Visit to PCP Within last 6 months   Prior Functional Level Independent in ADLs/IADLs   Can patient return to prior living arrangement Yes   Ability to make needs known: Good   Family able to assist with home care needs: Yes   Would you like for me

## 2024-11-19 NOTE — PROGRESS NOTES
Infectious Disease Progress Note    Today's Date: 11/19/2024   Admit Date: 11/18/2024    Date of Consultation:  November 19, 2024  Reason for consult: bacteremia and malfunctioning PICC line  Referring Physician: MD Trey    HPI:    Patient is a 79 y.o. male with medical history of hypertension who experienced bilateral knee pain about 2 weeks ago, pain changed to right lateral pain after playing golf which progressed to bilateral hip pain. Pt was hospitalized between 11/3 to 11/7 with MSSA bacteremia due to right sacroiliac joint septic arthritis. Pt was discharged to home on 11/7 after confirming BC being negative for 48 hours. Pt was discharged to home with recommendation to complete total 6 weeks of IV ancef, expected last dose of 12/16 Blood cx turned positive on 11/9 @6:20 am. No one was notified with delay positive BC results.    Pt's MRI of pelvis on 11/6 revealed RIGHT sacroiliac joint septic arthritis. Orthopedic and IR team were not able to aspirate the fluid due to the joint fluid is too superior and anteriorly.     Pt presented to ER on 11/18 with malfunctioning of PICC line. Once we discovered the delay of positive blood cx, we recommended for PICC line removal then send a pair of Blood cx, CBC with diff, CRP, and BMP    During visit, pt denies any discomfort. No fever, chills, sob, marquez, chest pain, abd pain, diarrhea, or dysuria. Pt is ambulating hallway with the cane, slow but steady gait.    ID team was consulted for evaluation and treatment recommendations regarding bacteremia.    Impression:   Bacteremia  Right sacroiliac joint septic arthritis  - afebrile, wbc 7.4    Blood cx (11/3) MSSA (11/5) no growth so far    U/A (-)    ALMA (-) for vegetation on 11/6    MRI of right hip (11/6)  Findings compatible with RIGHT sacroiliac joint septic arthritis (please  note, the joint fluid is too superior and anterior to aspirate percutaneously). Suspected associated proximal right sciatic neuritis and  pneumoperitoneum. RETROPERITONEUM: No lymphadenopathy or aortic aneurysm. The origins of the celiac axis, SMA, single renal arteries bilaterally, and MAXIMINO are widely patent. REPRODUCTIVE ORGANS: The prostate is enlarged, measuring 6.0 cm. URINARY BLADDER: No mass or calculus. BONES: Degenerative changes are seen in the lumbar spine. ADDITIONAL COMMENTS: Small periumbilical hernia contains only fat.     No evidence of aortic aneurysm or dissection. No acute abnormality. Colonic diverticulosis. Prostate enlargement. Electronically signed by TIM PAK      Thank you for the opportunity to participate in the care of this patient. Please contact with questions or concerns.     The above plan of care that has been discussed and agreed upon by Dr. Parekh.    Signed By: Saman Parehk MD     November 19, 2024      A total time of 25 minutes was spent on today's encounter.  Greater than 50% of the time was spent on the following:  Preparing for visit and chart review.  Obtaining and/or reviewing separately obtained history  Performing a medically appropriate exam and/or evaluation  Counseling and educating a patient/family/caregiver as noted above  Referring and communicating with other professionals (not separately reported)  Independently interpreting results (not separately reported) and communicating results to the patient/family/caregiver  Care coordination (not separately reported) as noted above  Documenting clinical information in the electronic health records (e.g. problem list, visit note) on the day of the encounter

## 2024-11-19 NOTE — CARE COORDINATION
11/19/24 1034   Readmission Assessment   Number of Days since last admission? 8-30 days   Previous Disposition Home with Home Health  ( nurse, EMILY Ancef x 6 weeks)   Who is being Interviewed Patient   What was the patient's/caregiver's perception as to why they think they needed to return back to the hospital? Other (Comment)  (return of the infection)   Did you visit your Primary Care Physician after you left the hospital, before you returned this time? No   Why weren't you able to visit your PCP? Did not have an appointment   Did you see a specialist, such as Cardiac, Pulmonary, Orthopedic Physician, etc. after you left the hospital? Yes   Who advised the patient to return to the hospital? Self-referral   In our efforts to provide the best possible care to you and others like you, can you think of anything that we could have done to help you after you left the hospital the first time, so that you might not have needed to return so soon? Other (Comment)  (none)     SAMMY RUGGIERO

## 2024-11-20 LAB
ANION GAP SERPL CALC-SCNC: 6 MMOL/L (ref 2–12)
BASOPHILS # BLD: 0.1 K/UL (ref 0–0.1)
BASOPHILS NFR BLD: 1 % (ref 0–1)
BUN SERPL-MCNC: 24 MG/DL (ref 6–20)
BUN/CREAT SERPL: 27 (ref 12–20)
CALCIUM SERPL-MCNC: 9.4 MG/DL (ref 8.5–10.1)
CHLORIDE SERPL-SCNC: 105 MMOL/L (ref 97–108)
CO2 SERPL-SCNC: 28 MMOL/L (ref 21–32)
CREAT SERPL-MCNC: 0.88 MG/DL (ref 0.7–1.3)
DIFFERENTIAL METHOD BLD: ABNORMAL
EOSINOPHIL # BLD: 0.3 K/UL (ref 0–0.4)
EOSINOPHIL NFR BLD: 4 % (ref 0–7)
ERYTHROCYTE [DISTWIDTH] IN BLOOD BY AUTOMATED COUNT: 12.4 % (ref 11.5–14.5)
GLUCOSE SERPL-MCNC: 98 MG/DL (ref 65–100)
HCT VFR BLD AUTO: 31.7 % (ref 36.6–50.3)
HGB BLD-MCNC: 10.6 G/DL (ref 12.1–17)
IMM GRANULOCYTES # BLD AUTO: 0 K/UL (ref 0–0.04)
IMM GRANULOCYTES NFR BLD AUTO: 0 % (ref 0–0.5)
LYMPHOCYTES # BLD: 1.4 K/UL (ref 0.8–3.5)
LYMPHOCYTES NFR BLD: 18 % (ref 12–49)
MCH RBC QN AUTO: 32.8 PG (ref 26–34)
MCHC RBC AUTO-ENTMCNC: 33.4 G/DL (ref 30–36.5)
MCV RBC AUTO: 98.1 FL (ref 80–99)
MONOCYTES # BLD: 1 K/UL (ref 0–1)
MONOCYTES NFR BLD: 13 % (ref 5–13)
NEUTS SEG # BLD: 5 K/UL (ref 1.8–8)
NEUTS SEG NFR BLD: 64 % (ref 32–75)
NRBC # BLD: 0 K/UL (ref 0–0.01)
NRBC BLD-RTO: 0 PER 100 WBC
PLATELET # BLD AUTO: 405 K/UL (ref 150–400)
PMV BLD AUTO: 9.7 FL (ref 8.9–12.9)
POTASSIUM SERPL-SCNC: 4.7 MMOL/L (ref 3.5–5.1)
RBC # BLD AUTO: 3.23 M/UL (ref 4.1–5.7)
SODIUM SERPL-SCNC: 139 MMOL/L (ref 136–145)
WBC # BLD AUTO: 7.7 K/UL (ref 4.1–11.1)

## 2024-11-20 PROCEDURE — 6370000000 HC RX 637 (ALT 250 FOR IP): Performed by: INTERNAL MEDICINE

## 2024-11-20 PROCEDURE — 99232 SBSQ HOSP IP/OBS MODERATE 35: CPT | Performed by: NURSE PRACTITIONER

## 2024-11-20 PROCEDURE — 2580000003 HC RX 258: Performed by: INTERNAL MEDICINE

## 2024-11-20 PROCEDURE — 85025 COMPLETE CBC W/AUTO DIFF WBC: CPT

## 2024-11-20 PROCEDURE — APPSS15 APP SPLIT SHARED TIME 0-15 MINUTES: Performed by: NURSE PRACTITIONER

## 2024-11-20 PROCEDURE — 80048 BASIC METABOLIC PNL TOTAL CA: CPT

## 2024-11-20 PROCEDURE — 6360000002 HC RX W HCPCS: Performed by: INTERNAL MEDICINE

## 2024-11-20 PROCEDURE — 1100000000 HC RM PRIVATE

## 2024-11-20 RX ADMIN — LOSARTAN POTASSIUM 100 MG: 50 TABLET, FILM COATED ORAL at 08:48

## 2024-11-20 RX ADMIN — WATER 2000 MG: 1 INJECTION INTRAMUSCULAR; INTRAVENOUS; SUBCUTANEOUS at 08:48

## 2024-11-20 RX ADMIN — SODIUM CHLORIDE, PRESERVATIVE FREE 10 ML: 5 INJECTION INTRAVENOUS at 20:28

## 2024-11-20 RX ADMIN — TRAMADOL HYDROCHLORIDE 50 MG: 50 TABLET ORAL at 22:32

## 2024-11-20 RX ADMIN — WATER 2000 MG: 1 INJECTION INTRAMUSCULAR; INTRAVENOUS; SUBCUTANEOUS at 00:54

## 2024-11-20 RX ADMIN — TRAMADOL HYDROCHLORIDE 50 MG: 50 TABLET ORAL at 08:47

## 2024-11-20 RX ADMIN — FAMOTIDINE 20 MG: 20 TABLET, FILM COATED ORAL at 08:49

## 2024-11-20 RX ADMIN — TRAMADOL HYDROCHLORIDE 50 MG: 50 TABLET ORAL at 15:32

## 2024-11-20 RX ADMIN — FAMOTIDINE 20 MG: 20 TABLET, FILM COATED ORAL at 20:28

## 2024-11-20 RX ADMIN — VERAPAMIL HYDROCHLORIDE 180 MG: 180 TABLET, FILM COATED, EXTENDED RELEASE ORAL at 20:28

## 2024-11-20 RX ADMIN — SODIUM CHLORIDE, PRESERVATIVE FREE 10 ML: 5 INJECTION INTRAVENOUS at 08:49

## 2024-11-20 RX ADMIN — TAMSULOSIN HYDROCHLORIDE 0.4 MG: 0.4 CAPSULE ORAL at 08:48

## 2024-11-20 RX ADMIN — WATER 2000 MG: 1 INJECTION INTRAMUSCULAR; INTRAVENOUS; SUBCUTANEOUS at 17:32

## 2024-11-20 RX ADMIN — ENOXAPARIN SODIUM 40 MG: 100 INJECTION SUBCUTANEOUS at 08:48

## 2024-11-20 ASSESSMENT — PAIN DESCRIPTION - DESCRIPTORS
DESCRIPTORS: DISCOMFORT
DESCRIPTORS: CRAMPING
DESCRIPTORS: CRAMPING

## 2024-11-20 ASSESSMENT — PAIN SCALES - GENERAL
PAINLEVEL_OUTOF10: 8
PAINLEVEL_OUTOF10: 4
PAINLEVEL_OUTOF10: 0
PAINLEVEL_OUTOF10: 6
PAINLEVEL_OUTOF10: 1

## 2024-11-20 ASSESSMENT — PAIN DESCRIPTION - ORIENTATION
ORIENTATION: RIGHT

## 2024-11-20 ASSESSMENT — PAIN DESCRIPTION - LOCATION
LOCATION: HIP

## 2024-11-20 ASSESSMENT — PAIN - FUNCTIONAL ASSESSMENT
PAIN_FUNCTIONAL_ASSESSMENT: ACTIVITIES ARE NOT PREVENTED
PAIN_FUNCTIONAL_ASSESSMENT: ACTIVITIES ARE NOT PREVENTED

## 2024-11-20 NOTE — PROGRESS NOTES
Infectious Disease Progress     INFECTIOUS DISEASE Attending:     I agree with the above infectious disease daily progress note in its entirety as authored by and discussed in detail with the nurse practitioner.   I have reviewed pertinent laboratory studies, microbiology cultures, radiologic reports with review of the consultations and progress notes as appropriate.   I agree with today's subjective findings, physical examination, assessment and plan of care as described above and discussed extensively with the nurse practitioner.       Continue Cefazolin.    Consult orthopedics for right SI joint infection - role for debridement/washout?    Follow repeat blood cultures x2 sets.    If no intervention per above, then will arrange for PICC line replacement and orthopedics follow up outpatient either @ [facility] associated orthopedics or [facility] per his preference.    A total time of 20 minutes was spent on today's encounter.  Greater than 50% of the time was spent on the following:  Preparing for visit and chart review.  Obtaining and/or reviewing separately obtained history  Performing a medically appropriate exam and/or evaluation  Counseling and educating a patient/family/caregiver as noted above  Placing relevant orders  Referring and communicating with other professionals (not separately reported)  Independently interpreting results (not separately reported) and communicating results to the patient/family/caregiver  Care coordination (not separately reported) as noted above  Documenting clinical information in the electronic health records (e.g. problem list, visit note) on the day of the encounter      Impression:   Bacteremia  Right sacroiliac joint septic arthritis  - afebrile, wbc 7.4    Blood cx (11/3, 11/5) MSSA, (11/18) no growth so far    U/A (-)    ALMA (-) for vegetation on 11/6    MRI of right hip (11/19) Worsening appearance of right sacroiliac joint septic arthritis, with new focus of osteonecrosis in the  right sacral ala and increasing size of joint effusion with synovial outpouchings. 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    Plan:     - continue with IV ancef    Ortho team was consulted    We will extend the abx therapy after review the ortho recommendation.       Plan of care d/w pt, Dr. High, and Dr. Parekh            History of Present Illness   11/18/2024  Patient is a 79 y.o. male with medical history of hypertension who experienced bilateral knee pain about 2 weeks ago, pain changed to right lateral pain after playing golf which progressed to bilateral hip pain. Pt was hospitalized between 11/3 to 11/7 with MSSA bacteremia due to right sacroiliac joint septic arthritis. Pt was discharged to home on 11/7 after confirming BC being negative for 48 hours. Pt was discharged to home with recommendation to complete total 6 weeks of IV ancef, expected last dose of 12/16 Blood cx turned positive on 11/9 @6:20 am. No one was notified with delay positive BC results.     Pt's MRI of pelvis on 11/6 revealed RIGHT sacroiliac joint septic arthritis. Orthopedic and IR team were not able to aspirate the fluid due to the joint fluid is too superior and anteriorly.      Pt presented to ER on 11/18 with malfunctioning of PICC line. Once we discovered the delay of positive blood cx, we recommended for PICC line removal then send a pair of Blood cx, CBC with diff, CRP, and BMP     During visit, pt denies any discomfort. No fever, chills, sob, herzog, chest pain, abd pain, diarrhea, or dysuria. Pt is ambulating hallway with the cane, slow but steady gait.     ID team was consulted for evaluation and treatment recommendations regarding bacteremia.    Subjective:    'feeling ok\"  Review of Systems:            Symptom Y/N Comments   Symptom Y/N Comments   Fever/Chills n      Chest Pain  n      Poor Appetite       Edema        Cough       Abdominal Pain  n      Sputum       Joint Pain        SOB/HERZOG n      Pruritis/Rash

## 2024-11-20 NOTE — CONSULTS
ORTHOPAEDIC CONSULT NOTE    Subjective:     Date of Consultation:  November 20, 2024  Referring Physician:  Dr. Saman Parekh MD    Perfecto You is a 79 y.o. male who is being re-evaluated for right sacroiliac septic arthritis. Patient was seen and examined 2 weeks ago after being admitted with bacteremia and right-sided SI pain. Patient was found to have MSSA bacteremia and MRI of his right SI joint showed a large joint effusion with some osseous edema. Decision was made at that time to defer fluoroscopic aspiration of the SI joint and proceed with 6 week course of IV antibiotics. Patient was discharged with a PICC and IV Ancef 2g 3 times daily. He states that after a few days he was starting to feel somewhat better and the pain in his hip/buttock was beginning to subside. Patient was also seen by an orthopedic provider [facility] several days ago that recommended no surgical intervention.    He was sent back in to hospital yesterday by home healthcare after his PICC became dislodged and was not functioning properly. A blood culture bottle from 11/5 showed positive when it was finalized on 11/9 (after the patient had been discharged), so decision was made to re-draw blood cultures and hold off on re-inserting the PICC line. MRI of the pelvis was re-ordered and showed interval increase in effusion within the R sacroiliac joint.     Patient is sitting in bed comfortably with minimal pain at this time. He denies fever, chills, numbness, tingling, weakness.     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 No pertinent family

## 2024-11-20 NOTE — PROGRESS NOTES
[facility]                                                                                          Hospitalist Progress Note  Sushma Madala, MD  Office Phone: [phone]        Date of Service:  2024  NAME:  Perfecto You  :  1945  MRN:  [id number]       Admission Summary:   Perfecto You is a 79 y.o. male with HTN and recently diagnosed R sacroiliac septic arthritis and MSSA bacteremia (on cefazolin x 6weeks until 2024 via RUE PICC) who was sent by HH for dislodged PICC in [facility]. BC 11/3 grew MSSA in 4 of 4 bottles and  in 2 of 4 bottles (finalized on ). ID was consulted. PICC was not replaced. Pt c/o R hip pain, constipation (last BM was this morning), and itchy rash on his left flank. He denies f/c/n/v, CP, SOB, cough, abdominal pain, recent falls, injuries, LOC, numbness, tingling, weakness.        Interval history / Subjective:   Patient is seen and examined at bedside this AM.  Hip pain controlled. He is aware of MRI report- waiting on Ortho recs   Discussed with nursing, cm      Assessment & Plan:      MSSA bacteremia  Dislodged RUE PICC  R sacroiliac septic arthritis  - PICC team consulted; PICC removed and PIV placed  - BC 11/3 MSSA 4 of 4 bottles,  2 of 4 bottles but not finalized until  and not reported to medical staff  - TTE  EF 40-45%, + WMA, no vegetations  - BC  : NGTD   - appreciate ID input   - c/w  cefazolin   - Pelvic MRI : Worsening appearance of right sacroiliac joint septic arthritis, with new  focus of osteonecrosis in the right sacral ala and increasing size of joint effusion with synovial outpouchings. 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  - Orthopedics consult placed      HTN  - continue home losartan, verapamil     BPH  - continue home tamsulosin     Code status: Full  Prophylaxis: lovenox   Care Plan discussed with: pt, RN, CM   Anticipated Disposition: TBD. Home with HH when ready  PRN    ceFAZolin (ANCEF) 2,000 mg in sterile water 20 mL IV syringe  2,000 mg IntraVENous Q8H    sodium chloride flush 0.9 % injection 5-40 mL  5-40 mL IntraVENous 2 times per day    sodium chloride flush 0.9 % injection 5-40 mL  5-40 mL IntraVENous PRN    0.9 % sodium chloride infusion   IntraVENous PRN    ondansetron (ZOFRAN) injection 4 mg  4 mg IntraVENous Q6H PRN    polyethylene glycol (GLYCOLAX) packet 17 g  17 g Oral Daily PRN    acetaminophen (TYLENOL) tablet 650 mg  650 mg Oral Q6H PRN    famotidine (PEPCID) tablet 20 mg  20 mg Oral BID    lidocaine 4 % external patch 1 patch  1 patch TransDERmal Q24H    losartan (COZAAR) tablet 100 mg  100 mg Oral Daily    tamsulosin (FLOMAX) capsule 0.4 mg  0.4 mg Oral Daily    verapamil (CALAN SR) extended release tablet 180 mg  180 mg Oral Nightly    enoxaparin (LOVENOX) injection 40 mg  40 mg SubCUTAneous Daily    diphenhydrAMINE-zinc acetate (BENADRYL) cream   Topical BID PRN     ______________________________________________________________________  EXPECTED LENGTH OF STAY: 6  ACTUAL LENGTH OF STAY:          2                 Sushma Madala, MD

## 2024-11-20 NOTE — PLAN OF CARE
Problem: Pain  Goal: Verbalizes/displays adequate comfort level or baseline comfort level  11/19/2024 2244 by Paige Block, RN  Outcome: Progressing     Problem: Safety - Adult  Goal: Free from fall injury  11/19/2024 2244 by Paige Block, RN  Outcome: Progressing

## 2024-11-21 ENCOUNTER — TELEPHONE (OUTPATIENT)
Facility: HOSPITAL | Age: 79
End: 2024-11-21

## 2024-11-21 VITALS
BODY MASS INDEX: 27.98 KG/M2 | RESPIRATION RATE: 17 BRPM | OXYGEN SATURATION: 96 % | HEIGHT: 72 IN | TEMPERATURE: 97.9 F | WEIGHT: 206.57 LBS | DIASTOLIC BLOOD PRESSURE: 74 MMHG | SYSTOLIC BLOOD PRESSURE: 119 MMHG | HEART RATE: 71 BPM

## 2024-11-21 LAB
ANION GAP SERPL CALC-SCNC: 8 MMOL/L (ref 2–12)
BASOPHILS # BLD: 0.1 K/UL (ref 0–0.1)
BASOPHILS NFR BLD: 1 % (ref 0–1)
BUN SERPL-MCNC: 21 MG/DL (ref 6–20)
BUN/CREAT SERPL: 22 (ref 12–20)
CALCIUM SERPL-MCNC: 9.3 MG/DL (ref 8.5–10.1)
CHLORIDE SERPL-SCNC: 105 MMOL/L (ref 97–108)
CO2 SERPL-SCNC: 27 MMOL/L (ref 21–32)
COMMENT:: NORMAL
CREAT SERPL-MCNC: 0.97 MG/DL (ref 0.7–1.3)
DIFFERENTIAL METHOD BLD: ABNORMAL
EOSINOPHIL # BLD: 0.2 K/UL (ref 0–0.4)
EOSINOPHIL NFR BLD: 3 % (ref 0–7)
ERYTHROCYTE [DISTWIDTH] IN BLOOD BY AUTOMATED COUNT: 12.4 % (ref 11.5–14.5)
GLUCOSE SERPL-MCNC: 88 MG/DL (ref 65–100)
HCT VFR BLD AUTO: 32 % (ref 36.6–50.3)
HGB BLD-MCNC: 10.5 G/DL (ref 12.1–17)
IMM GRANULOCYTES # BLD AUTO: 0 K/UL (ref 0–0.04)
IMM GRANULOCYTES NFR BLD AUTO: 0 % (ref 0–0.5)
LYMPHOCYTES # BLD: 1.4 K/UL (ref 0.8–3.5)
LYMPHOCYTES NFR BLD: 21 % (ref 12–49)
MCH RBC QN AUTO: 32.5 PG (ref 26–34)
MCHC RBC AUTO-ENTMCNC: 32.8 G/DL (ref 30–36.5)
MCV RBC AUTO: 99.1 FL (ref 80–99)
MONOCYTES # BLD: 1 K/UL (ref 0–1)
MONOCYTES NFR BLD: 15 % (ref 5–13)
NEUTS SEG # BLD: 4.1 K/UL (ref 1.8–8)
NEUTS SEG NFR BLD: 60 % (ref 32–75)
NRBC # BLD: 0 K/UL (ref 0–0.01)
NRBC BLD-RTO: 0 PER 100 WBC
PLATELET # BLD AUTO: 395 K/UL (ref 150–400)
PMV BLD AUTO: 9.8 FL (ref 8.9–12.9)
POTASSIUM SERPL-SCNC: 4.7 MMOL/L (ref 3.5–5.1)
RBC # BLD AUTO: 3.23 M/UL (ref 4.1–5.7)
SODIUM SERPL-SCNC: 140 MMOL/L (ref 136–145)
SPECIMEN HOLD: NORMAL
WBC # BLD AUTO: 6.9 K/UL (ref 4.1–11.1)

## 2024-11-21 PROCEDURE — 6360000002 HC RX W HCPCS: Performed by: INTERNAL MEDICINE

## 2024-11-21 PROCEDURE — APPSS15 APP SPLIT SHARED TIME 0-15 MINUTES: Performed by: NURSE PRACTITIONER

## 2024-11-21 PROCEDURE — 99232 SBSQ HOSP IP/OBS MODERATE 35: CPT | Performed by: NURSE PRACTITIONER

## 2024-11-21 PROCEDURE — 2709999900 HC NON-CHARGEABLE SUPPLY

## 2024-11-21 PROCEDURE — 6370000000 HC RX 637 (ALT 250 FOR IP): Performed by: INTERNAL MEDICINE

## 2024-11-21 PROCEDURE — 05HY33Z INSERTION OF INFUSION DEVICE INTO UPPER VEIN, PERCUTANEOUS APPROACH: ICD-10-PCS | Performed by: INTERNAL MEDICINE

## 2024-11-21 PROCEDURE — 2580000003 HC RX 258: Performed by: INTERNAL MEDICINE

## 2024-11-21 PROCEDURE — 85025 COMPLETE CBC W/AUTO DIFF WBC: CPT

## 2024-11-21 PROCEDURE — 80048 BASIC METABOLIC PNL TOTAL CA: CPT

## 2024-11-21 PROCEDURE — 76937 US GUIDE VASCULAR ACCESS: CPT

## 2024-11-21 PROCEDURE — 36569 INSJ PICC 5 YR+ W/O IMAGING: CPT

## 2024-11-21 PROCEDURE — C1751 CATH, INF, PER/CENT/MIDLINE: HCPCS

## 2024-11-21 RX ORDER — CEFAZOLIN SODIUM 1 G/3ML
2000 INJECTION, POWDER, FOR SOLUTION INTRAMUSCULAR; INTRAVENOUS EVERY 8 HOURS
Qty: 1 EACH | Refills: 0 | Status: SHIPPED
Start: 2024-11-21 | End: 2024-12-20

## 2024-11-21 RX ORDER — TRAMADOL HYDROCHLORIDE 50 MG/1
50 TABLET ORAL EVERY 6 HOURS PRN
Qty: 20 TABLET | Refills: 0 | Status: SHIPPED | OUTPATIENT
Start: 2024-11-21 | End: 2024-11-26

## 2024-11-21 RX ADMIN — ENOXAPARIN SODIUM 40 MG: 100 INJECTION SUBCUTANEOUS at 08:21

## 2024-11-21 RX ADMIN — WATER 2000 MG: 1 INJECTION INTRAMUSCULAR; INTRAVENOUS; SUBCUTANEOUS at 01:20

## 2024-11-21 RX ADMIN — WATER 2000 MG: 1 INJECTION INTRAMUSCULAR; INTRAVENOUS; SUBCUTANEOUS at 08:21

## 2024-11-21 RX ADMIN — FAMOTIDINE 20 MG: 20 TABLET, FILM COATED ORAL at 08:22

## 2024-11-21 RX ADMIN — SODIUM CHLORIDE, PRESERVATIVE FREE 10 ML: 5 INJECTION INTRAVENOUS at 08:24

## 2024-11-21 RX ADMIN — ACETAMINOPHEN 650 MG: 325 TABLET ORAL at 01:22

## 2024-11-21 RX ADMIN — TAMSULOSIN HYDROCHLORIDE 0.4 MG: 0.4 CAPSULE ORAL at 08:24

## 2024-11-21 ASSESSMENT — PAIN DESCRIPTION - LOCATION: LOCATION: HIP

## 2024-11-21 ASSESSMENT — PAIN DESCRIPTION - DESCRIPTORS: DESCRIPTORS: DISCOMFORT

## 2024-11-21 ASSESSMENT — PAIN SCALES - GENERAL: PAINLEVEL_OUTOF10: 8

## 2024-11-21 ASSESSMENT — PAIN DESCRIPTION - ORIENTATION: ORIENTATION: RIGHT

## 2024-11-21 NOTE — PROCEDURES
PROCEDURE NOTE  Date: 11/21/2024   Name: Perfecto You  YOB: 1945    PICC Placement Note    PRE-PROCEDURE VERIFICATION  Correct Procedure: yes  Correct Site:  yes  Temperature: Temp: 97.9 °F (36.6 °C), Temperature Source:    Recent Labs     11/21/24  0544   BUN 21*      WBC 6.9       Allergies: Penicillins    Education materials, including PICC Booklet and written information regarding central catheter related bloodstream infection and prevention given to patient.   See Patient Education activity for further details.    PROCEDURE DETAIL  A single lumen power injectable PICC line was started for long term IV antibiotic therapy. The following documentation is in addition to the PICC properties in the lines/airways flowsheet :  Lot #: [id number]  Xylocaine 1% used intradermally: yes  Total Catheter Length:  39 (cm)  External Catheter Length: 0 (cm)  Circumference: 29 (cm)  Catheter to vein ratio: 22 %  Vein Selection for PICC: right basilic  Central Line Bundle followed: yes  Complication Related to Insertion: none    The placement was verified by ECG technology. The PICC is on the right side and the tip overlies the superior vena cava.  ECG verification documentation seen below.    Line is okay to use.  Report to Rogers.    Regulo Sam, JOSE ENRIQUEN, RN, [facility]-BC   Vascular Access Team

## 2024-11-21 NOTE — PROGRESS NOTES
dilatation. Electronically signed by Hakeem Andrew    Vascular duplex lower extremity venous bilateral    Result Date: 11/5/2024    No evidence of right or left lower extremity vein thrombosis.     Echo (TTE) complete (PRN contrast/bubble/strain/3D)    Result Date: 11/4/2024    Image quality is fair.   Left Ventricle: Normal left ventricular systolic function with a visually estimated EF of 55 - 60%. Left ventricle size is normal. Normal wall thickness. Normal wall motion. Normal diastolic function.   Aortic Valve: Not well visualized. Trace regurgitation. No significant stenosis. Consider ALMA for further evaluation if clinically indicated.     CTA CHEST W WO CONTRAST    Result Date: 11/4/2024   EXAM:  CTA CHEST W WO CONTRAST INDICATION: Periumbilical pain. Cough. COMPARISON: Radiograph 11/3/2024. CT abdomen pelvis 11/3/2024. TECHNIQUE: Helical thin section chest CT following uneventful intravenous administration of nonionic contrast according to departmental PE protocol. Coronal and sagittal reformats were performed. 3D/MIP post processing was performed. CT dose reduction was achieved through use of a standardized protocol tailored for this examination and automatic exposure control for dose modulation. FINDINGS: This is a good quality study for the evaluation of pulmonary embolism to the first subsegmental arterial level. There is no pulmonary embolism to this level. The visualized thyroid gland is unremarkable. The aorta and main pulmonary artery are normal in caliber. Cardiac size is within normal limits. No pericardial effusion. No lymphadenopathy by imaging size criteria. The lungs are clear. No pleural effusion or pneumothorax. Central airways are unremarkable. The gallbladder is surgically absent. There is no acute abnormality in the visualized upper abdomen. There is no aggressive bony lesion.     No acute pulmonary artery embolism or other acute abnormality in the chest. Electronically signed by Hakeem WYNN  enlarged, measuring 6.0 cm. URINARY BLADDER: No mass or calculus. BONES: Degenerative changes are seen in the lumbar spine. ADDITIONAL COMMENTS: Small periumbilical hernia contains only fat.     No evidence of aortic aneurysm or dissection. No acute abnormality. Colonic diverticulosis. Prostate enlargement. Electronically signed by TIM PAK      Thank you for the opportunity to participate in the care of this patient.   Please contact with questions or concerns.      The above plan of care that has been discussed and agreed upon by Dr. Parekh.  A total time of 15 minutes was spent on today's encounter.  Greater than 50% of the time was spent on the following:  Preparing for visit and chart review.  Obtaining and/or reviewing separately obtained history  Performing a medically appropriate exam and/or evaluation  Counseling and educating a patient/family/caregiver as noted above  Referring and communicating with other professionals (not separately reported)  Independently interpreting results (not separately reported) and communicating results to the patient/family/caregiver  Care coordination (not separately reported) as noted above  Documenting clinical information in the electronic health records (e.g. problem list, visit note) on the day of the encounter    SHAY Leon NP

## 2024-11-21 NOTE — PLAN OF CARE
Discharge IV Antibiotic Order  [facility]  [address] [place] [id number]  TEL: [phone]  FAX: [phone]      1.  Diagnosis:  Right sacroiliac joint septic arthritis, MSSA bacteremia  2.  Antibiotic:  IV ancef 2 gm every 8 hours       END DATE: 12/20/2024  3.  Routine PICC/ Jenny/ Portacath Care including PRN catheter flow management  4.  Weekly labs:   [x] CBC/diff/platelets   [x] BUN/Creatinine    [x] CPK. Please hold your cholesterol medication (name ends with STATIN) while you are taking daily Daptomycin.    []  AST/Total bilirubin/Alkaline Phosphatase    [x] CRP   []Trough Vancomycin level goal 15-20. Drawn every Monday and Thursday. Clinical pharmacy consult for Vancomycin dosing according to the trough level.   5.  Fax lab to [phone].  Call Critical Lab Values to [phone]  6.  May send to IR for line evaluation or replacement [phone]  7.  Home Health to pull PICC line at end of therapy or send to IR for Jenny removal  8.  Allergies:    Allergies   Allergen Reactions    Penicillins Hives     As a child      9.  Recommend to follow up with SHAY Jimenez - NP       soft, nondistended , normal bowel sounds. mild RLQ to palpation

## 2024-11-21 NOTE — PROGRESS NOTES
Nurse handed patient a copy of discharge instructions which have been read and explained to patient. . New medications read and explained. Patient gives verbal understanding. Patient aware that prescriptions have been electronically sent to pharmacy. Opportunity for questions and clarification offered. Removed patient's IV access with no complications, VS stable, and patient sent with all belongings.

## 2024-11-21 NOTE — CARE COORDINATION
JOHN;    Patient discharged home with his wife. Resumption of care orders sent to [facility].     Patient left before IMM letter could be delivered.    JOSE ENRIQUE [facility]  Care Management Department  Ph:[phone]

## 2024-11-21 NOTE — DISCHARGE SUMMARY
Discharge Summary       PATIENT ID: Perfecto You  MRN: [id number]   YOB: 1945    DATE OF ADMISSION: 11/18/2024 12:19 PM    DATE OF DISCHARGE: 11/21/2024   PRIMARY CARE PROVIDER: Oscar Toledo MD     ATTENDING PHYSICIAN: NAT MILLS MD   DISCHARGING PROVIDER: Nat Mills MD    To contact this individual call [phone] and ask the  to page.  If unavailable ask to be transferred the Adult Hospitalist Department.    CONSULTATIONS: IP CONSULT TO VASCULAR ACCESS TEAM  IP CONSULT TO INFECTIOUS DISEASES  IP CONSULT TO HOSPITALIST  IP CONSULT TO CASE MANAGEMENT  IP CONSULT TO ORTHOPEDIC SURGERY  IP CONSULT TO VASCULAR ACCESS TEAM  IP CONSULT TO CASE MANAGEMENT  IP CONSULT HOME HEALTH    PROCEDURES/SURGERIES: * No surgery found *    ADMITTING DIAGNOSES & HOSPITAL COURSE:   Perfecto You is a 79 y.o. male with HTN and recently diagnosed R sacroiliac septic arthritis and MSSA bacteremia (on cefazolin x 6weeks until 12/16/2024 via E PICC) who was sent by  for dislodged PICC in [facility]. BC 11/3 grew MSSA in 4 of 4 bottles and 11/5 in 2 of 4 bottles (finalized on 11/9). ID was consulted. PICC was not replaced. Pt c/o R hip pain, constipation (last BM was this morning), and itchy rash on his left flank. He denies f/c/n/v, CP, SOB, cough, abdominal pain, recent falls, injuries, LOC, numbness, tingling, weakness.         DISCHARGE DIAGNOSES / PLAN:      MSSA bacteremia  Dislodged RUE PICC  R sacroiliac septic arthritis  - PICC team consulted; PICC removed and PIV placed  - BC 11/3 MSSA 4 of 4 bottles, 11/5 2 of 4 bottles but not finalized until 11/9 and not reported to medical staff  - TTE 11/6 EF 40-45%, + WMA, no vegetations  - BC 11/18 : NGTD   - appreciate ID input   - c/w  cefazolin   - Pelvic MRI : Worsening appearance of right sacroiliac joint septic arthritis, with new  focus of osteonecrosis in the right sacral ala and increasing size of joint effusion with synovial outpouchings. Ongoing multifocal

## 2024-11-21 NOTE — DISCHARGE INSTRUCTIONS
Discharge Instructions       PATIENT ID: Perfecto You  MRN: [id number]   YOB: 1945    DATE OF ADMISSION: [unfilled]    DATE OF DISCHARGE: 11/21/2024    PRIMARY CARE PROVIDER: @PCP@     ATTENDING PHYSICIAN: [unfilled]  DISCHARGING PROVIDER: Nat Benson MD    To contact this individual call [phone] and ask the  to page.   If unavailable ask to be transferred the Adult Hospitalist Department.    DISCHARGE DIAGNOSES Right sacroiliac septic arthritis     CONSULTATIONS: [unfilled]    PROCEDURES/SURGERIES: * No surgery found *    PENDING TEST RESULTS:   At the time of discharge the following test results are still pending: none     FOLLOW UP APPOINTMENTS:   [unfilled]     ADDITIONAL CARE RECOMMENDATIONS:   IV ancef 2 gm every 8 hours     END DATE: 12/20/2024 via PICC line  Follow up infectious disease doctor   Follow up orthopedics at [facility] in 2 weeks   Can take tramadol for severe pain     DIET: regular diet      ACTIVITY: activity as tolerated          Radiology      DISCHARGE MEDICATIONS:   See Medication Reconciliation Form    It is important that you take the medication exactly as they are prescribed.   Keep your medication in the bottles provided by the pharmacist and keep a list of the medication names, dosages, and times to be taken in your wallet.   Do not take other medications without consulting your doctor.       NOTIFY YOUR PHYSICIAN FOR ANY OF THE FOLLOWING:   Fever over 101 degrees for 24 hours.   Chest pain, shortness of breath, fever, chills, nausea, vomiting, diarrhea, change in mentation, falling, weakness, bleeding. Severe pain or pain not relieved by medications.  Or, any other signs or symptoms that you may have questions about.      DISPOSITION:    Home With:   OT  PT  HH x RNx       SNF/Inpatient Rehab/LTAC    Independent/assisted living    Hospice    Other:     CDMP Checked:   Yes x     PROBLEM LIST Updated:  Yes x       Signed:   Nat Benson MD  11/21/2024  12:37 PM

## 2024-11-23 LAB
BACTERIA SPEC CULT: NORMAL
BACTERIA SPEC CULT: NORMAL
SERVICE CMNT-IMP: NORMAL
SERVICE CMNT-IMP: NORMAL

## 2024-12-05 ENCOUNTER — TELEPHONE (OUTPATIENT)
Age: 79
End: 2024-12-05

## 2024-12-05 NOTE — TELEPHONE ENCOUNTER
Oralia from [facility] called with abnormal lab results.  C reactive protein cardiac was 8.51  BUN was 30      Her call back number is [phone]

## 2024-12-06 ENCOUNTER — OFFICE VISIT (OUTPATIENT)
Age: 79
End: 2024-12-06
Payer: MEDICARE

## 2024-12-06 VITALS
RESPIRATION RATE: 18 BRPM | WEIGHT: 206 LBS | OXYGEN SATURATION: 98 % | BODY MASS INDEX: 27.94 KG/M2 | DIASTOLIC BLOOD PRESSURE: 80 MMHG | SYSTOLIC BLOOD PRESSURE: 152 MMHG | HEART RATE: 80 BPM

## 2024-12-06 DIAGNOSIS — B95.61 MSSA BACTEREMIA: Primary | ICD-10-CM

## 2024-12-06 DIAGNOSIS — R78.81 MSSA BACTEREMIA: Primary | ICD-10-CM

## 2024-12-06 DIAGNOSIS — M46.58 SEPTIC ARTHRITIS OF SACROILIAC JOINT (HCC): ICD-10-CM

## 2024-12-06 PROCEDURE — 1111F DSCHRG MED/CURRENT MED MERGE: CPT | Performed by: INTERNAL MEDICINE

## 2024-12-06 PROCEDURE — G8419 CALC BMI OUT NRM PARAM NOF/U: HCPCS | Performed by: INTERNAL MEDICINE

## 2024-12-06 PROCEDURE — 1036F TOBACCO NON-USER: CPT | Performed by: INTERNAL MEDICINE

## 2024-12-06 PROCEDURE — G8427 DOCREV CUR MEDS BY ELIG CLIN: HCPCS | Performed by: INTERNAL MEDICINE

## 2024-12-06 PROCEDURE — 1126F AMNT PAIN NOTED NONE PRSNT: CPT | Performed by: INTERNAL MEDICINE

## 2024-12-06 PROCEDURE — G8484 FLU IMMUNIZE NO ADMIN: HCPCS | Performed by: INTERNAL MEDICINE

## 2024-12-06 PROCEDURE — 99214 OFFICE O/P EST MOD 30 MIN: CPT | Performed by: INTERNAL MEDICINE

## 2024-12-06 PROCEDURE — 1159F MED LIST DOCD IN RCRD: CPT | Performed by: INTERNAL MEDICINE

## 2024-12-06 PROCEDURE — 1123F ACP DISCUSS/DSCN MKR DOCD: CPT | Performed by: INTERNAL MEDICINE

## 2024-12-06 RX ORDER — ASPIRIN 81 MG/1
81 TABLET, CHEWABLE ORAL DAILY
COMMUNITY

## 2024-12-06 RX ORDER — MULTIVIT-MIN/FOLIC/VIT K/LYCOP 400-20-370
TABLET ORAL DAILY
COMMUNITY
Start: 2024-11-09

## 2024-12-06 RX ORDER — FLUTICASONE PROPIONATE AND SALMETEROL 250; 50 UG/1; UG/1
1 POWDER RESPIRATORY (INHALATION) 2 TIMES DAILY
COMMUNITY
Start: 2024-10-21 | End: 2025-10-21

## 2024-12-06 RX ORDER — CIPROFLOXACIN 500 MG/1
TABLET, FILM COATED ORAL
COMMUNITY

## 2024-12-06 RX ORDER — BACILLUS CALMETTE-GUERIN 50 MG/50ML
POWDER, FOR SUSPENSION INTRAVESICAL
COMMUNITY
Start: 2024-09-04

## 2024-12-06 RX ORDER — CLINDAMYCIN HYDROCHLORIDE 300 MG/1
CAPSULE ORAL
COMMUNITY

## 2024-12-06 RX ORDER — TRAMADOL HYDROCHLORIDE 50 MG/1
TABLET ORAL
COMMUNITY

## 2024-12-06 NOTE — PROGRESS NOTES
1. Have you been to the ER, urgent care clinic since your last visit?  Hospitalized since your last visit?No    2. Have you seen or consulted any other health care providers outside of the [facility] since your last visit?  Include any pap smears or colon screening. No    Tolerating abx well, from [facility]  [facility] is HH

## 2024-12-13 ENCOUNTER — TELEPHONE (OUTPATIENT)
Age: 79
End: 2024-12-13

## 2024-12-13 NOTE — TELEPHONE ENCOUNTER
Oralia a  nurse with [facility] called to let us know some lab results she just got back.    They are as follows:    RBC (Red blood cell count): 3.61    Hgb (Hemoglobin): 11.7     Hct (Hematocrit): 34.9    CRP: 67.25    C-Reactive Protein Quant: 53     I read these values and labs back to Oralia and she confirmed the values.     Further investigated and looked these labs up in [facility] and confirmed the values as well. Sent perfect serve to provider as these are a big difference from last lab draw.    I asked Oralia if she has seen the patient she said she saw him Wednesday and vitals were good, patient had no complaints at that time.

## 2024-12-20 ENCOUNTER — TELEPHONE (OUTPATIENT)
Age: 79
End: 2024-12-20

## 2024-12-20 NOTE — TELEPHONE ENCOUNTER
Labs reviewed by provider, per provider Dr. Iglesia kimbrough to remove PICC, called HH and advised them of this.

## 2024-12-20 NOTE — PROGRESS NOTES
[facility] Progress Note  Saman Parekh MD   Phone [phone]  Fax [phone]      12/20/2024      Assessment & Plan:     79 y.o. male seen for follow up visit doing mostly better on Cefazolin course.    Continue Cefazolin via PICC line with excellent adherence until 12/20/24, inclusive.    Afterwards, remove PICC line and stop antibiotics.    Needs to continue orthopedics follow up if further source control needed for right SI joint septic arthritis.  Concern for potential recurrence of infection if source not addressed.            Subjective:     80 y/o male seen for follow up visit.  Recent MSSA bacteremia and septic arthritis right SI joint on MRI given severe right hip pain seen by orthopedics inpatient and also Dr. Andrade @ [facility] and no intervention needed at this time.  Plays golf frequently and perhaps underlying orthopedic injury to SI joint?    Was bacteremia @ [facility] during admission from 11/3-11/7/24, MSSA in 4/4 bottles 11/3, one bottle from both sets 11/5 after 4 days incubation and patient was subsequently discharged.  Re-admitted 11/18-11/21 for PICC line dysfunction and subsequently removed, repeat blood cultures no growth on 11/18/24.  TTE without evidence of endocarditis.  Repeat MRI with concern at least for radiographic worsening of right hip yet patient with improved symptoms.      Currently feels well.  Still some right sided hip pain and walking with cane though much improved from before.    Objective:     Vitals: BP (!) 152/80   Pulse 80   Resp 18   Wt 93.4 kg (206 lb)   SpO2 98%   BMI 27.94 kg/m²       Physical Examination:   General:  Alert, cooperative, no distress   Head:  Normocephalic, atraumatic no thrush.   Eyes:  Conjunctivae clear   Neck: Supple   Lungs:   No distress.        Heart:  Regular rate   Abdomen:   Soft, non-tender, non-distended   Extremities: Moves all.  JOSEP negative right sided   Skin: No rashes or lesions, PICC line clean and

## 2025-02-07 ENCOUNTER — OFFICE VISIT (OUTPATIENT)
Age: 80
End: 2025-02-07
Payer: MEDICARE

## 2025-02-07 VITALS
HEART RATE: 82 BPM | HEIGHT: 72 IN | DIASTOLIC BLOOD PRESSURE: 70 MMHG | SYSTOLIC BLOOD PRESSURE: 120 MMHG | OXYGEN SATURATION: 95 % | BODY MASS INDEX: 27.9 KG/M2 | RESPIRATION RATE: 16 BRPM | WEIGHT: 206 LBS

## 2025-02-07 DIAGNOSIS — M46.58 SEPTIC ARTHRITIS OF SACROILIAC JOINT (HCC): Primary | ICD-10-CM

## 2025-02-07 PROCEDURE — 1159F MED LIST DOCD IN RCRD: CPT | Performed by: INTERNAL MEDICINE

## 2025-02-07 PROCEDURE — 99214 OFFICE O/P EST MOD 30 MIN: CPT | Performed by: INTERNAL MEDICINE

## 2025-02-07 PROCEDURE — 1123F ACP DISCUSS/DSCN MKR DOCD: CPT | Performed by: INTERNAL MEDICINE

## 2025-02-07 PROCEDURE — 1036F TOBACCO NON-USER: CPT | Performed by: INTERNAL MEDICINE

## 2025-02-07 PROCEDURE — G8419 CALC BMI OUT NRM PARAM NOF/U: HCPCS | Performed by: INTERNAL MEDICINE

## 2025-02-07 PROCEDURE — 1126F AMNT PAIN NOTED NONE PRSNT: CPT | Performed by: INTERNAL MEDICINE

## 2025-02-07 PROCEDURE — G8427 DOCREV CUR MEDS BY ELIG CLIN: HCPCS | Performed by: INTERNAL MEDICINE

## 2025-02-07 NOTE — PROGRESS NOTES
Chief Complaint   Patient presents with    Follow-up     C/o slight discomfort in his hip    1. Have you been to the ER, urgent care clinic since your last visit?  Hospitalized since your last visit?No    2. Have you seen or consulted any other health care providers outside of the [facility] since your last visit?  Include any pap smears or colon screening. No

## 2025-02-08 NOTE — PROGRESS NOTES
[facility] Progress Note  Saman Parekh MD   Phone [phone]  Fax [phone]      2/8/2025      Assessment & Plan:     No further treatment nor workup needed at this time for MSSA right SI joint septic arthritis and bacteremia.    Counseled on signs and symptoms of recurrence of infection - if they do happen and patient stable, will need repeat blood cultures and MRI of pelvis with and without contrast.    Should symptoms be intolerable should they recur, will refer to ED.    Continue work with outpatient PT.    Subjective:     Doing well and regaining most of mobility.  Not playing golf yet.  Mild pain from low back to right lateral thigh.  Working with outpatient PT/OT.  Saw [facility] and Dr. Tapia and off antibiotics.    Objective:     Vitals: /70 (Site: Left Upper Arm, Position: Sitting, Cuff Size: Medium Adult)   Pulse 82   Resp 16   Ht 1.829 m (6')   Wt 93.4 kg (206 lb)   SpO2 95%   BMI 27.94 kg/m²       Physical Examination:   General:  Alert, cooperative, no distress   Head:  Normocephalic, atraumatic no thrush.   Eyes:  Conjunctivae clear   Neck: Supple   Lungs:   No distress.        Heart:  Regular rate   Abdomen:   Soft, non-tender, non-distended   Extremities: Moves all.  No cyanosis or edema.   Skin: JOSEP negative right side, no TTP SI joint and right hip mobility preserved, no limitation of ROM   Neurologic: Moving all extremities     Labs:          Medications       Current Outpatient Medications   Medication Sig Dispense Refill    aspirin 81 MG chewable tablet Take 1 tablet by mouth daily      BCG Live (SALVADOR BCG) 50 MG injection Instill 16 mg by intravesical route.      ciprofloxacin (CIPRO) 500 MG tablet TAKE 1 TABLET POST PROCEDURE      clindamycin (CLEOCIN) 300 MG capsule TAKE 1 CAPSULE BY MOUTH THREE TIMES A DAY FOR 7 DAYS      fluticasone-salmeterol (ADVAIR) 250-50 MCG/ACT AEPB diskus inhaler Inhale 1 puff into the lungs 2 times daily

## (undated) PROCEDURE — 05HY33Z INSERTION OF INFUSION DEVICE INTO UPPER VEIN, PERCUTANEOUS APPROACH: ICD-10-PCS